# Patient Record
Sex: MALE | Race: WHITE | NOT HISPANIC OR LATINO | Employment: OTHER | URBAN - METROPOLITAN AREA
[De-identification: names, ages, dates, MRNs, and addresses within clinical notes are randomized per-mention and may not be internally consistent; named-entity substitution may affect disease eponyms.]

---

## 2023-03-23 ENCOUNTER — TELEPHONE (OUTPATIENT)
Dept: CARDIOLOGY CLINIC | Facility: CLINIC | Age: 88
End: 2023-03-23

## 2023-03-23 ENCOUNTER — CONSULT (OUTPATIENT)
Dept: CARDIOLOGY CLINIC | Facility: CLINIC | Age: 88
End: 2023-03-23

## 2023-03-23 VITALS
BODY MASS INDEX: 20.83 KG/M2 | HEIGHT: 64 IN | WEIGHT: 122 LBS | DIASTOLIC BLOOD PRESSURE: 64 MMHG | SYSTOLIC BLOOD PRESSURE: 160 MMHG | HEART RATE: 33 BPM | OXYGEN SATURATION: 100 %

## 2023-03-23 DIAGNOSIS — G30.1 MODERATE LATE ONSET ALZHEIMER'S DEMENTIA WITHOUT BEHAVIORAL DISTURBANCE, PSYCHOTIC DISTURBANCE, MOOD DISTURBANCE, OR ANXIETY (HCC): ICD-10-CM

## 2023-03-23 DIAGNOSIS — I65.23 OCCLUSION AND STENOSIS OF BILATERAL CAROTID ARTERIES: ICD-10-CM

## 2023-03-23 DIAGNOSIS — I65.23 BILATERAL CAROTID ARTERY STENOSIS: ICD-10-CM

## 2023-03-23 DIAGNOSIS — E78.2 MIXED DYSLIPIDEMIA: ICD-10-CM

## 2023-03-23 DIAGNOSIS — E05.90 SUBCLINICAL HYPERTHYROIDISM: ICD-10-CM

## 2023-03-23 DIAGNOSIS — H90.3 BILATERAL SENSORINEURAL HEARING LOSS: ICD-10-CM

## 2023-03-23 DIAGNOSIS — R00.1 SYMPTOMATIC BRADYCARDIA: ICD-10-CM

## 2023-03-23 DIAGNOSIS — I25.2 HISTORY OF HEART ATTACK: ICD-10-CM

## 2023-03-23 DIAGNOSIS — N18.30 STAGE 3 CHRONIC KIDNEY DISEASE, UNSPECIFIED WHETHER STAGE 3A OR 3B CKD (HCC): ICD-10-CM

## 2023-03-23 DIAGNOSIS — N40.1 BPH WITH OBSTRUCTION/LOWER URINARY TRACT SYMPTOMS: ICD-10-CM

## 2023-03-23 DIAGNOSIS — M48.062 SPINAL STENOSIS OF LUMBAR REGION WITH NEUROGENIC CLAUDICATION: ICD-10-CM

## 2023-03-23 DIAGNOSIS — I45.2 INCOMPLETE RIGHT BUNDLE BRANCH BLOCK (RBBB) WITH LEFT ANTERIOR FASCICULAR BLOCK: ICD-10-CM

## 2023-03-23 DIAGNOSIS — E53.8 COBALAMIN DEFICIENCY: ICD-10-CM

## 2023-03-23 DIAGNOSIS — Z87.39 HISTORY OF GOUT: ICD-10-CM

## 2023-03-23 DIAGNOSIS — N13.8 BPH WITH OBSTRUCTION/LOWER URINARY TRACT SYMPTOMS: ICD-10-CM

## 2023-03-23 DIAGNOSIS — R73.9 HYPERGLYCEMIA: ICD-10-CM

## 2023-03-23 DIAGNOSIS — I10 ESSENTIAL HYPERTENSION: ICD-10-CM

## 2023-03-23 DIAGNOSIS — K21.9 GERD WITHOUT ESOPHAGITIS: ICD-10-CM

## 2023-03-23 DIAGNOSIS — E55.9 VITAMIN D DEFICIENCY: ICD-10-CM

## 2023-03-23 DIAGNOSIS — D50.9 IRON DEFICIENCY ANEMIA, UNSPECIFIED IRON DEFICIENCY ANEMIA TYPE: ICD-10-CM

## 2023-03-23 DIAGNOSIS — Z85.46 HISTORY OF PROSTATE CANCER: ICD-10-CM

## 2023-03-23 DIAGNOSIS — F02.B0 MODERATE LATE ONSET ALZHEIMER'S DEMENTIA WITHOUT BEHAVIORAL DISTURBANCE, PSYCHOTIC DISTURBANCE, MOOD DISTURBANCE, OR ANXIETY (HCC): ICD-10-CM

## 2023-03-23 DIAGNOSIS — I44.1 SECOND DEGREE HEART BLOCK BY ELECTROCARDIOGRAM (ECG): Primary | ICD-10-CM

## 2023-03-23 DIAGNOSIS — I35.0 NON-RHEUMATIC AORTIC STENOSIS: ICD-10-CM

## 2023-03-23 PROBLEM — F03.B0 MODERATE DEMENTIA WITHOUT BEHAVIORAL DISTURBANCE, PSYCHOTIC DISTURBANCE, MOOD DISTURBANCE, OR ANXIETY (HCC): Status: ACTIVE | Noted: 2023-03-23

## 2023-03-23 PROBLEM — I1A.0 RESISTANT HYPERTENSION: Status: ACTIVE | Noted: 2023-03-23

## 2023-03-23 PROBLEM — E78.5 DYSLIPIDEMIA: Status: ACTIVE | Noted: 2023-03-23

## 2023-03-23 RX ORDER — DIVALPROEX SODIUM 125 MG/1
1 TABLET, DELAYED RELEASE ORAL 2 TIMES DAILY
COMMUNITY
Start: 2023-02-27

## 2023-03-23 RX ORDER — LANOLIN ALCOHOL/MO/W.PET/CERES
1 CREAM (GRAM) TOPICAL DAILY
COMMUNITY
Start: 2023-02-27

## 2023-03-23 RX ORDER — CHLORHEXIDINE GLUCONATE 0.12 MG/ML
15 RINSE ORAL ONCE
OUTPATIENT
Start: 2023-03-23 | End: 2023-03-23

## 2023-03-23 RX ORDER — SIMVASTATIN 10 MG
10 TABLET ORAL
Qty: 30 TABLET | Refills: 5
Start: 2023-03-23

## 2023-03-23 RX ORDER — COLCHICINE 0.6 MG/1
0.6 TABLET ORAL DAILY
Qty: 30 TABLET | Refills: 5
Start: 2023-03-23

## 2023-03-23 RX ORDER — DONEPEZIL HYDROCHLORIDE 5 MG/1
5 TABLET, FILM COATED ORAL
Qty: 30 TABLET | Refills: 5
Start: 2023-03-23

## 2023-03-23 RX ORDER — AMLODIPINE BESYLATE 2.5 MG/1
1 TABLET ORAL DAILY
COMMUNITY
Start: 2023-02-27

## 2023-03-23 RX ORDER — ACETAMINOPHEN 325 MG/1
2 TABLET ORAL EVERY 6 HOURS PRN
COMMUNITY
Start: 2023-02-20

## 2023-03-23 RX ORDER — LOSARTAN POTASSIUM 25 MG/1
25 TABLET ORAL DAILY
COMMUNITY
Start: 2023-02-27

## 2023-03-23 RX ORDER — DONEPEZIL HYDROCHLORIDE 10 MG/1
10 TABLET, FILM COATED ORAL DAILY
COMMUNITY
Start: 2023-02-27 | End: 2023-03-23 | Stop reason: DRUGHIGH

## 2023-03-23 RX ORDER — ALLOPURINOL 100 MG/1
1 TABLET ORAL DAILY
COMMUNITY
Start: 2023-02-27

## 2023-03-23 RX ORDER — SIMVASTATIN 20 MG
20 TABLET ORAL
COMMUNITY
Start: 2023-02-27 | End: 2023-03-23 | Stop reason: DRUGHIGH

## 2023-03-23 RX ORDER — DICYCLOMINE HYDROCHLORIDE 10 MG/1
1 CAPSULE ORAL
COMMUNITY
Start: 2023-02-27

## 2023-03-23 NOTE — TELEPHONE ENCOUNTER
Per Aliya Mendez at [facility] Cath lab consent must be obtained from Dr performing the procedure

## 2023-03-23 NOTE — PATIENT INSTRUCTIONS
Patient has strong indication for permanent pacemaker insertion with high degree second degree AV block, pulse rate of 33 bpm, possibly related to calcific aortic valve disease  We will need to discuss further with his medical power of  Danica Montes, [phone], who works from 9 AM to 5:30 PM Monday through Friday  We will expect a call back from [facility] regarding this matter

## 2023-03-23 NOTE — PROGRESS NOTES
Cardiology Office Consultation   Good Garcia 80 y o  male MRN: [id number]  03/23/23  7:57 PM        Assessment/Plan     1  Symptomatic bradycardia caused by second-degree AV block with 2: 1 conduction, heart rate of 33 bpm, with associated first-degree AV block, incomplete right bundle branch block and LAFB  2   Examination suggests at least moderately severe to severe aortic valve stenosis with potential associated microcalcification of the bundle of His, which could be the source of his advanced high degree AV block  3   Clinically stable CAD with history of remote myocardial infarction at age 46 and no recent coronary artery disease evaluation  4   History of treated mixed dyslipidemia  5   Longstanding essential hypertension, treated  6   Late onset Alzheimer's disease without behavioral disturbance  7   Bilateral sensorineural hearing loss  8   History of gout involving right foot  9   History of iron deficiency anemia and cobalamin deficiency as well as vitamin D deficiency  10   Stage III chronic kidney disease  11   History of prostate cancer and BPH with lower urinary tract symptoms  12   History of lumbar spinal stenosis with neurogenic claudication  13   History of bilateral carotid artery stenosis  14   History of thyroid nodule and subclinical hyperthyroidism  15   History of hiatal hernia and chronic constipation  Plan and   Patient Instructions     1  Patient has strong indication for permanent pacemaker insertion with high degree second degree AV block, pulse rate of 33 bpm, possibly related to calcific aortic valve disease  2  We will need to discuss further with his medical power of  Lisa Rosales, [phone], who works from 9 AM to 5:30 PM Monday through Friday  3  We will expect a call back from Jorden Moralez regarding this matter  4   I spoke to daughter Lisa Rosales, after the patient left the office, in her role as medical power of  for this patient    After explaining the details of the permanent pacemaker implantation procedure, which included indication, details of how the procedure is performed, complications, benefits, risks, she provided informed consent verbally for us to schedule the procedure as soon as possible, preferably on a Friday  We will proceed with the orders today  5   We have requested the following laboratory data prior to pacemaker implant: CBC, PT/PTT, CMP, A1c, lipid panel, TSH  Current Outpatient Medications   Medication Sig Dispense Refill   • acetaminophen (TYLENOL) 325 mg tablet Take 2 tablets by mouth every 6 (six) hours as needed for pain     • allopurinol (ZYLOPRIM) 100 mg tablet Take 1 tablet by mouth in the morning     • amLODIPine (NORVASC) 2 5 mg tablet Take 1 tablet by mouth daily     • colchicine (COLCRYS) 0 6 mg tablet Take 1 tablet (0 6 mg total) by mouth daily 30 tablet 5   • dicyclomine (BENTYL) 10 mg capsule Take 1 capsule by mouth 2 (two) times a day before meals     • divalproex sodium (DEPAKOTE) 125 mg EC tablet Take 1 tablet by mouth 2 (two) times a day     • donepezil (ARICEPT) 5 mg tablet Take 1 tablet (5 mg total) by mouth daily at bedtime 30 tablet 5   • losartan (COZAAR) 25 mg tablet Take 25 mg by mouth in the morning     • simvastatin (ZOCOR) 10 mg tablet Take 1 tablet (10 mg total) by mouth daily at bedtime 30 tablet 5   • vitamin B-12 (VITAMIN B-12) 1,000 mcg tablet Take 1 tablet by mouth in the morning       No current facility-administered medications for this visit  History of Present Illness     Physician Requesting Consult: [facility]      Reason for Consult / Principal Problem: Marked bradycardia      HPI:       Lorin Garcia is an 80y o  year old male who presents for cardiology consultation in follow-up of recently uncovered severe bradycardia with heart rates of 31-33 bpm and recent onset of increased sleepiness and decreased alertness    He was found to have severe bradycardia at a heart rate of 31 bpm on EKG dated 3/14/2023  His daughter reports that he is had increased recent onset of sleepiness compared to his normal pattern  This patient and his wife moved into the [facility] assisted living [facility] in December, 2022 because of his progressive dementia and inability to manage their day-to-day needs  The patient's past medical history includes remote heart attack at age of 46, stable chronic CAD, stage III chronic kidney disease, chronic iron deficiency anemia, cobalamin deficiency, history of prostate cancer and BPH with lower urinary tract symptoms, deafness with bilateral sensorineural hearing loss, hiatal hernia, dyslipidemia, essential hypertension, lumbar spinal stenosis with neurogenic claudication, chronic constipation, bilateral carotid artery stenosis, thyroid nodule, late onset Alzheimer's disease, currently with no behavioral disturbance, subclinical hyperthyroidism, weight loss, hyperglycemia, orthostatic hypotension, gout of right great toe, vitamin D deficiency  He does have a history of a heart murmur which has not been formally evaluated  Apart from increased sleepiness and diminished alertness recently, the patient denies any recent chest pain, dyspnea, syncope, lightheadedness, dizziness, palpitations, cough, wheezing, sputum production, edema, fever, chills  Family history was noncontributory  The social history was positive for former smoking with patient having quit smoking about 28 years ago  The patient was a 1 glass of wine drinker until about 3 months ago  There is no history of illicit drug use  He is a retired  and is currently residing in assisted living with his wife  They moved from their home in [place], [place] to the above assisted living facility  The patient's medical power of  is Randall Trejo, phone number [phone]    His other daughter accompanying the patient today is [place] Carline  Historical Information   Past Medical History:   Diagnosis Date   • History of heart attack    CAD  Chronic kidney disease, stage III   Chronic iron deficiency anemia  Cobalamin deficiency  History of prostate cancer and BPH with lower urinary tract symptoms  Bilateral sensorineural hearing loss  Hiatal hernia  Dyslipidemia  Essential hypertension  Lumbar spinal stenosis with neurogenic claudication  Chronic constipation  Bilateral carotid artery stenosis  Thyroid nodule  Late onset Alzheimer's disease with previous behavioral disturbance  Subclinical hyperthyroidism  Weight loss  Hyperglycemia  Orthostatic hypotension  Gout of great right toe  Vitamin D deficiency    Past Surgical History:  Past Surgical History:   Procedure Laterality Date   • HIATAL HERNIA REPAIR     Unspecified bilateral knee surgery  Greenlight laser PVP of prostate  Right ankle repair      Social History     Substance and Sexual Activity   Alcohol Use Yes   Formerly drank 1 glass of wine per day  Social History     Substance and Sexual Activity   Drug Use Never     Social History     Tobacco Use   Smoking Status Former   • Packs/day: 1 00   • Types: Cigarettes   • Quit date:    • Years since quittin   Smokeless Tobacco Not on file     History reviewed  No pertinent family history  Meds/Allergies   Prior to Admission medications    Medication Sig Start Date End Date Taking?  Authorizing Provider   acetaminophen (TYLENOL) 325 mg tablet Take 2 tablets by mouth every 6 (six) hours as needed for pain 23  Yes Historical Provider, MD   allopurinol (ZYLOPRIM) 100 mg tablet Take 1 tablet by mouth in the morning 23  Yes Historical Provider, MD   amLODIPine (NORVASC) 2 5 mg tablet Take 1 tablet by mouth daily 23  Yes Historical Provider, MD   colchicine (COLCRYS) 0 6 mg tablet Take 1 tablet (0 6 mg total) by mouth daily 3/23/23  Yes Dipak Durant MD   dicyclomine (BENTYL) 10 mg capsule Take 1 capsule by mouth 2 (two) times a day before meals 2/27/23  Yes Historical Provider, MD   divalproex sodium (DEPAKOTE) 125 mg EC tablet Take 1 tablet by mouth 2 (two) times a day 2/27/23  Yes Historical Provider, MD   donepezil (ARICEPT) 5 mg tablet Take 1 tablet (5 mg total) by mouth daily at bedtime 3/23/23  Yes Argenis Perdue MD   losartan (COZAAR) 25 mg tablet Take 25 mg by mouth in the morning 2/27/23  Yes Historical Provider, MD   simvastatin (ZOCOR) 10 mg tablet Take 1 tablet (10 mg total) by mouth daily at bedtime 3/23/23  Yes Argenis Perdue MD   vitamin B-12 (VITAMIN B-12) 1,000 mcg tablet Take 1 tablet by mouth in the morning 2/27/23  Yes Historical Provider, MD   donepezil (ARICEPT) 10 mg tablet Take 10 mg by mouth daily 2/27/23 3/23/23 Yes Historical Provider, MD   simvastatin (ZOCOR) 20 mg tablet Take 20 mg by mouth daily at bedtime 2/27/23 3/23/23 Yes Historical Provider, MD     No Known Allergies    Cardiovascular ROS:   More than 10 systems reviewed and all systems negative, except as noted in history of present illness    Objective     VITALS:   Blood pressure 160/64, pulse (!) 33, height 5' 4" (1 626 m), weight 55 3 kg (122 lb), SpO2 100 %  Body mass index is 20 94 kg/m²  12 pound weight gain in approximately 3-1/2 months    Physical Exam      General-Well-developed slender very hard of hearing elderly  male  in no acute distress  Patient is alert, oriented X3 and cooperative  Skin-Warm and dry with no pallor, rashes, ecchymoses, lesions  HEENT-Normocephalic  Pupils equally round and reactive to light and accommodation  Extraocular muscles intact  Mucous membranes pink and moist  Sclerae nonicteric  Optic fundi poorly seen  Neck-No jugular venous distention, AJR, masses, thyromegaly, adenopathy, bruits  Lungs-No rales, rhonchi, wheezes  Heart-No  gallop, rub, click, heave, thrill    Grade 3/6 medium pitched aortic systolic ejection murmur heard with radiation to both clavicles, manubrium, left more than right sternal border, and apex  The apical systolic ejection murmur radiates somewhat to the anterior axillary line  There is moderate muffling of A2 and a somewhat sluggish carotid upstroke  Abdomen-Normal bowel sounds with no masses, organomegaly, guarding, tenderness, or rigidity  Extremities-No cyanosis, clubbing, edema, pulse decrease  Neurological-No focal neurological signs  EKG 03/23/23:     Normal sinus rhythm with 2: 1 second-degree AV block, first-degree AV block and marked bradycardia at 33 bpm  Incomplete right bundle branch block and mild degree of LAFB  Prolonged QT  Abnormal ECG, unchanged from 3/14/2023      IMAGING:    No Chest XR results available for this patient  LAB REVIEW:    No results found for: SODIUM, K, CL, CO2, ANIONGAP, BUN, CREATININE, GLUCOSE, GLUF, CALCIUM, CORRECTEDCA, AST, ALT, ALKPHOS, PROT, BILITOT, EGFR  No results found for: CHOLESTEROL  No results found for: HDL  No results found for: LDLCHOLEST  No results found for: LDLCALC  No components found for: DIRECTLDLREFLEX  No results found for: IGS5IBZPBJPE  No results found for: TRIG          Total office time spent today 64  minutes  Because of complexity of reviewing all records provided by outside physician, need to call power of  in addition to speaking with patient and his daughter present in the office, this visit was very complex with complex medical decision making and order construction          Jean Haynes MD

## 2023-03-27 ENCOUNTER — TELEPHONE (OUTPATIENT)
Dept: CARDIOLOGY CLINIC | Facility: CLINIC | Age: 88
End: 2023-03-27

## 2023-03-27 NOTE — TELEPHONE ENCOUNTER
Left message for daughter to call back   Per Dr Juan Abdalla schedule Pacemaker with Dr Mandy Santamaria at [facility]

## 2023-03-29 ENCOUNTER — TELEPHONE (OUTPATIENT)
Dept: CARDIOLOGY CLINIC | Facility: CLINIC | Age: 88
End: 2023-03-29

## 2023-03-29 NOTE — TELEPHONE ENCOUNTER
Cardiac cath lab called April 14 date Is not available with anesthesia  Spoke to daughter Maria Elena Del Real she would like it to be done on a Friday next available date is not until April 21   Please advise

## 2023-04-02 ENCOUNTER — HOSPITAL ENCOUNTER (EMERGENCY)
Facility: HOSPITAL | Age: 88
Discharge: NURSING, REHAB OR LONG-TERM CARE | End: 2023-04-03
Attending: EMERGENCY MEDICINE | Admitting: EMERGENCY MEDICINE

## 2023-04-02 DIAGNOSIS — R00.1 SINUS BRADYCARDIA: Primary | ICD-10-CM

## 2023-04-02 NOTE — ED NOTES
Patient assisted with urinal  Standing at bedside with minimal standby assist  Patient resting in bed watching television  Patient is comfortable and denies any needs       Bernardo Singh RN  04/02/23 5873

## 2023-04-03 VITALS
DIASTOLIC BLOOD PRESSURE: 57 MMHG | RESPIRATION RATE: 20 BRPM | HEART RATE: 74 BPM | TEMPERATURE: 98 F | OXYGEN SATURATION: 99 % | SYSTOLIC BLOOD PRESSURE: 124 MMHG

## 2023-04-03 LAB
ATRIAL RATE: 40 BPM
P AXIS: 37 DEGREES
PR INTERVAL: 276 MS
QRS AXIS: 12 DEGREES
QRSD INTERVAL: 116 MS
QT INTERVAL: 506 MS
QTC INTERVAL: 412 MS
T WAVE AXIS: 60 DEGREES
VENTRICULAR RATE: 40 BPM

## 2023-04-03 RX ORDER — OLANZAPINE 10 MG/1
10 INJECTION, POWDER, LYOPHILIZED, FOR SOLUTION INTRAMUSCULAR ONCE
Status: COMPLETED | OUTPATIENT
Start: 2023-04-03 | End: 2023-04-03

## 2023-04-03 RX ADMIN — OLANZAPINE 10 MG: 10 INJECTION, POWDER, FOR SOLUTION INTRAMUSCULAR at 06:20
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
## 2023-04-03 NOTE — TELEPHONE ENCOUNTER
Per Dr Matteo Cespedes he spoke to anesthesia Anson Castano is scheduled for April 14 at [facility] for pacemaker  At 8 am      Daughter Desean Ruiz aware will notify her sister Juan Pablo Pulido unable to lm on her voicemeil
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
## 2023-04-14 PROBLEM — I21.9 MI (MYOCARDIAL INFARCTION) (HCC): Status: ACTIVE | Noted: 2023-04-14

## 2023-04-25 ENCOUNTER — CONSULT (OUTPATIENT)
Dept: CARDIOLOGY CLINIC | Facility: CLINIC | Age: 88
End: 2023-04-25

## 2023-04-25 VITALS
BODY MASS INDEX: 21 KG/M2 | OXYGEN SATURATION: 96 % | DIASTOLIC BLOOD PRESSURE: 80 MMHG | WEIGHT: 123 LBS | HEIGHT: 64 IN | HEART RATE: 75 BPM | SYSTOLIC BLOOD PRESSURE: 142 MMHG

## 2023-04-25 DIAGNOSIS — E78.2 MIXED HYPERLIPIDEMIA: ICD-10-CM

## 2023-04-25 DIAGNOSIS — I44.1 SECOND DEGREE HEART BLOCK BY ELECTROCARDIOGRAM (ECG): Primary | ICD-10-CM

## 2023-04-25 DIAGNOSIS — Z95.0 PACEMAKER: ICD-10-CM

## 2023-04-25 DIAGNOSIS — R00.1 BRADYCARDIA: ICD-10-CM

## 2023-04-25 NOTE — PROGRESS NOTES
Progress Note - Cardiology Office  [facility]    Ry Chris 80 y o  male MRN: [id number]  : 1935  Encounter: [id number]      Assessment:     1  Second degree heart block by electrocardiogram (ECG)    2  Bradycardia    3  Mixed hyperlipidemia    4  Pacemaker        Discussion Summary and Plan:    1  Sick sinus syndrome/second-degree AV heart block 2-1 conduction: Underwent placement of dual-chamber Medtronic pacemaker on 2023    -   Incisional site stable    -   Daughter states that remote monitoring box was placed by his bedside as per instructed in the hospital    -   Continue with arm immobilization for full 3 weeks    2  Hypertension: Blood pressures currently stable    -   Continue Norvasc 2 5 mg daily    -   Continue Cozaar 25 mg daily    3  Alzheimer's: Managed per [facility]    4  Dyslipidemia: Continue Zocor 10 mg daily        Patient / Sana Alvarado was advised and educated to call our office  immediately if  patient has any new symptoms of chest pain/shortness of breath, near-syncope, syncope, light headedness sustained palpitations  or any other cardiovascular symptoms before their scheduled follow-up appointment  Office number was provided #[phone]      Please call [phone] if any questions  Counseling :  A description of the counseling  Goals and Barriers  Patient's ability to self care: Yes  Medication side effect reviewed with patient in detail and all their questions answered to their satisfaction  HPI :     Ry Chris is a 80y o  year old male who came for follow up  He underwent placement of a dual-chamber Medtronic pacemaker on 2023 due to symptomatic bradycardia/second-degree AV heart block with 2-1 conduction  He was discharged home the next day and presents for site check today  Patient has been using the immobilizer to keep his left arm from elevating above his shoulder due to his history of dementia    Device appears to be functioning within normal limits and incision is well approximated and healed  Daughter Ena Strong is at bedside and reviewed discharge procedures and follow-up for pacemaker  Patient will need a 1 month follow-up with the device clinic here at the office and then we would continue remote interrogations after  Most likely would only be seen yearly for device checks in office and as needed for patient's history of hypertension  Review of Systems   Constitutional: Negative  Negative for activity change, fatigue and unexpected weight change  HENT: Negative  Negative for congestion, facial swelling, sinus pain and tinnitus  Eyes: Negative  Negative for photophobia and visual disturbance  Respiratory: Negative  Negative for chest tightness and shortness of breath  Cardiovascular: Negative  Gastrointestinal: Negative for abdominal distention, constipation and rectal pain  Endocrine: Negative  Negative for polydipsia, polyphagia and polyuria  Genitourinary: Negative  Negative for difficulty urinating  Musculoskeletal: Negative  Neurological: Negative  Negative for dizziness, syncope, weakness and numbness  Hematological: Negative  Psychiatric/Behavioral: Negative  Historical Information   Past Medical History:   Diagnosis Date   • History of heart attack      Past Surgical History:   Procedure Laterality Date   • CARDIAC ELECTROPHYSIOLOGY PROCEDURE N/A 2023    Procedure: Cardiac pacer implant;  Surgeon: Jose Alexander MD;  Location: [place] [address] CATH LAB;   Service: Cardiology   • HIATAL HERNIA REPAIR       Social History     Substance and Sexual Activity   Alcohol Use Not Currently     Social History     Substance and Sexual Activity   Drug Use Never     Social History     Tobacco Use   Smoking Status Former   • Packs/day: 1 00   • Types: Cigarettes   • Quit date: 12   • Years since quittin 3   Smokeless Tobacco Never     Family History: No family "history on file  Meds/Allergies     No Known Allergies    Current Outpatient Medications:   •  allopurinol (ZYLOPRIM) 100 mg tablet, Take 1 tablet by mouth in the morning, Disp: , Rfl:   •  amLODIPine (NORVASC) 2 5 mg tablet, Take 1 tablet by mouth daily, Disp: , Rfl:   •  colchicine (COLCRYS) 0 6 mg tablet, Take 1 tablet (0 6 mg total) by mouth daily, Disp: 30 tablet, Rfl: 5  •  dicyclomine (BENTYL) 10 mg capsule, Take 1 capsule by mouth 2 (two) times a day before meals, Disp: , Rfl:   •  divalproex sodium (DEPAKOTE) 125 mg EC tablet, Take 1 tablet by mouth 2 (two) times a day, Disp: , Rfl:   •  donepezil (ARICEPT) 5 mg tablet, Take 1 tablet (5 mg total) by mouth daily at bedtime, Disp: 30 tablet, Rfl: 5  •  losartan (COZAAR) 25 mg tablet, Take 25 mg by mouth in the morning, Disp: , Rfl:   •  simvastatin (ZOCOR) 10 mg tablet, Take 1 tablet (10 mg total) by mouth daily at bedtime, Disp: 30 tablet, Rfl: 5  •  vitamin B-12 (VITAMIN B-12) 1,000 mcg tablet, Take 1 tablet by mouth in the morning, Disp: , Rfl:     Vitals: Blood pressure 142/80, pulse 75, height 5' 4\" (1 626 m), weight 55 8 kg (123 lb), SpO2 96 %  Body mass index is 21 11 kg/m²  Wt Readings from Last 3 Encounters:   04/25/23 55 8 kg (123 lb)   04/14/23 56 7 kg (125 lb)   03/23/23 55 3 kg (122 lb)     Vitals:    04/25/23 1046   Weight: 55 8 kg (123 lb)     BP Readings from Last 3 Encounters:   04/25/23 142/80   04/15/23 (!) 174/68   04/02/23 124/57       Physical Exam:  Physical Exam  Vitals and nursing note reviewed  Constitutional:       General: He is not in acute distress  Appearance: Normal appearance  He is normal weight  HENT:      Right Ear: External ear normal       Left Ear: External ear normal    Eyes:      General: No scleral icterus  Right eye: No discharge  Left eye: No discharge  Cardiovascular:      Rate and Rhythm: Normal rate and regular rhythm  Pulses: Normal pulses        Heart sounds: Normal heart " sounds  Comments: Left anterior chest wall incision well approximated, Steri-Strips in place  Pulmonary:      Effort: Pulmonary effort is normal       Breath sounds: Normal breath sounds  Abdominal:      General: Bowel sounds are normal       Palpations: Abdomen is soft  Musculoskeletal:      Right lower leg: No edema  Left lower leg: No edema  Skin:     General: Skin is warm  Capillary Refill: Capillary refill takes less than 2 seconds  Neurological:      Mental Status: He is alert  Psychiatric:         Mood and Affect: Mood normal          Diagnostic Studies Review Cardio:      Cardiac testing:   XR chest portable    Result Date: 4/14/2023  Narrative: CHEST INDICATION:   Patient s/p Pacemaker/ICD Insertion  COMPARISON:  None  EXAM PERFORMED/VIEWS:  XR CHEST PORTABLE Images: 2 FINDINGS: Mildly increased interstitial lung markings are seen  There is blunting of the right costophrenic angle  No evidence of pneumothorax  Cardiac silhouette not accurately accessed on this projection  The patient is status post left-sided single lead pacemaker  Impression: Left-sided single-lead pacemaker in place  No evidence of pneumothorax  Small right pleural effusion  Mildly increased interstitial lung markings suggestive of pulmonary edema  Workstation performed: [id number]     XR chest 2 views    Result Date: 4/15/2023  Narrative: CHEST INDICATION:   Patient s/p Pacemaker/ICD Insertion  COMPARISON:  One view chest 4/14/2023 EXAM PERFORMED/VIEWS:  XR CHEST PA & LATERAL  The frontal view was performed utilizing dual energy radiographic technique  FINDINGS:  Left-sided chest wall intracardiac device is identified  Leads are intact  Normal cardiac silhouette  Aortic and mitral annular calcification is present  Prominent interstitial markings diffusely  Small right pleural effusion  No pneumothorax or airspace consolidation  Old healed left midclavicular fracture  Thoracic spondylosis   Surgical clips right upper quadrant of the abdomen attributed to prior cholecystectomy  Impression: Left cardiac pacer device in place  Leads appear intact  No pneumothorax  Small right pleural effusion  Prominent interstitial markings may represent mild interstitial edema or interstitial lung disease  Workstation performed: [id number]     Cardiac ep lab eps/ablations    Result Date: 4/14/2023  Narrative: Images from the original result were not included  Single-chamber Cardiac Pacemaker Placement Operative Report Russellchace Kirit [id number] 4/14/2023 @PCP@ Surgeon: Carlene Thomas MD Procedure(s): Single Chamber Permanent Pacemaker Implantation; Cardiac Fluoroscopy Indications: Secondary AV block, symptomatic bradycardia Procedure Details: The risks, benefits, complications,  alternative treatment options, and expected outcomes were discussed with the patient  The complications of infection, pneumothorax, cardiac perforation, cardiac tamponade, and even death, but not limited to it were discussed with patient the family, before the patient was brought to cath lab  Patient and family were explained about lead dislodgement and need for repeat procedures  Patient and family told that lead dislodgement is not a complication of procedure  The patient and/or family concurred with the proposed plan, giving informed consent  Patient was prepped and draped in the usual strict sterile fashion  Antibiotic infusion was started and above 20 cc Sensorcaine was infiltrated into the area just medial to the left deltopectoral groove  Access was obtained in left subclavian vein with Seldinger technique with ultrasound guidance and a guidewire was retained  Patient tolerated that procedure well no complication  This patient has advanced dementia it was decided to have a single-chamber pacemaker and anesthesia was used to provide sedation  Using a #15 scalpel, an incision was made   The incision was extended to the prepectoral fascia using blunt dissection as well as electrocautery  A small pocket was made and complete hemostasis was achieved  A antibiotic-soaked gauze was placed in the cavity  A 7F sheath passed over the  wire  Guiding catheter was placed through the sheath over the wire  With the help of J-tip wire guiding catheter was guided to the right ventricle  Once it was noted to be in septal area close to bundle of his  A HIS lead was placed to the guiding catheter while making sure that guiding catheter is RV septal area close to the bundle of HIS  Thresholds were checked and once we were sure that we are close to to the fascicles lead was screwed in  Position of the lead was confirmed in both COLBERT and MELVIN views Appropriate sensing and thresholds were noted  If all the numbers were acceptable the guiding catheter was carefully slit open and again the number were tested again  Adequate sensing and capture threshold and lead impedance was conformed again  Then 7 Divehi sheath was slit open  The lead was tied down to the prepectoral fascia and muscle  Thresholds were tested multiple times    Appropriate sensing and thresholds were obtained  No diaphragmatic pacing occurred at 10 V and 1 5 ms  A guide wire was advanced to the heart under fluoroscopic guidance  A sheath was advanced over the guidewire  A pacemaker lead was advanced to the heart under fluoroscopic guidance  The lead was fixated to the right ventricular apex  Appropriate sensing and thresholds were obtained  No diaphragmatic pacing occurred at 10 V and 1 5 ms  Position of the lead was confirmed in both COLBERT and MELVIN projections  If numbers were acceptable sheath was peeled away  Numbers were tested again for lead impedance, sensing and threshold  The lead was attached to the generator  The system was placed in the pocket  The pacemaker and lead system was visualized under fluoroscopy  Appropriate redundancy/slack in the leads were noted   The pins of the lead was beyond "the set screws  Antibiotic pouch was used to cover the pacemaker in the pocket  Hemostasis was reverified  The pocket was then closed with 2 running layers of the dissolvable sutures   Steri-Strips, a gauze dressing, and pressure dressing was placed over operative site  Pacemaker Data: Pacemaker is chronic single-chamber Measured Data: Estimated Blood Loss:  Less than 5 cc      Complications: No complications Disposition: Patient was transferred to holding area in stable condition  Condition: Stable Impression: Successful placement of single chamber pacemaker without any complications  Plan see as ordered  Dr Jesus Carrizales MD [facility] \"This note was completed in part utilizing [employer] direct voice recognition software  Grammatical errors, random word insertion, spelling mistakes, and incomplete sentences may be an occasional consequence of the system secondary to software limitations, ambient noise and hardware issues  Please read the chart carefully and recognize, using context, where substitutions have occurred  If you have any questions or concerns about the context, text or information contained within the body of this dictation, please contact myself, the provider, for further clarification  \"     Echo complete w/ contrast if indicated    Result Date: 4/14/2023  Narrative: •  Left Ventricle: Left ventricular cavity size is normal  Wall thickness is mildly increased  There is mild concentric hypertrophy  The left ventricular ejection fraction is 60% by visual estimation  Systolic function is normal  Wall motion is normal  Diastolic function is mildly abnormal, consistent with grade I (abnormal) relaxation  •  Left Atrium: The atrium is moderately dilated  •  Aortic Valve: The aortic valve is trileaflet  The leaflets are moderately thickened  The leaflets are moderately calcified  There is moderately reduced mobility  There is mild regurgitation  •  Mitral Valve:  There is severe annular " "calcification  There is moderate regurgitation  There is mild stenosis  Mean gradient around 5 mmHg  •  Tricuspid Valve: There is mild to moderate regurgitation  PAP 40 mm of Hg  •  Pulmonic Valve: There is mild regurgitation  •  Pericardium: There is a small pericardial effusion  Imaging:  Chest X-Ray:   XR chest 2 views    Result Date: 4/15/2023  Impression Left cardiac pacer device in place  Leads appear intact  No pneumothorax  Small right pleural effusion  Prominent interstitial markings may represent mild interstitial edema or interstitial lung disease  Workstation performed: [id number]         [facility]  Cardiology        \"This note was completed in part utilizing [employer] direct voice recognition software  Grammatical errors, random word insertion, spelling mistakes, and incomplete sentences may be an occasional consequence of the system secondary to software limitations, ambient noise and hardware issues  Please read the chart carefully and recognize, using context, where substitutions have occurred  If you have any questions or concerns about the context, text or information contained within the body of this dictation, please contact myself, the provider, for further clarification  \"  "

## 2023-04-28 ENCOUNTER — REMOTE DEVICE CLINIC VISIT (OUTPATIENT)
Dept: CARDIOLOGY CLINIC | Facility: CLINIC | Age: 88
End: 2023-04-28

## 2023-04-28 DIAGNOSIS — Z95.0 PRESENCE OF PERMANENT CARDIAC PACEMAKER: Primary | ICD-10-CM

## 2023-04-28 NOTE — PROGRESS NOTES
Results for orders placed or performed in visit on 04/28/23   Cardiac EP device report    Narrative    MDT SC/PM-ACTIVE SYSTEM IS MRI CONDITIONAL  CARELINK TRANSMISSION: BATTERY VOLTAGE ADEQUATE  (12 4 YRS)  99%  ALL AVAILABLE LEAD PARAMETERS WITHIN NORMAL LIMITS  NO SIGNIFICANT HIGH RATE EPISODES  NORMAL DEVICE FUNCTION  ---CHAVIRA

## 2023-10-19 ENCOUNTER — TELEPHONE (OUTPATIENT)
Dept: CARDIOLOGY CLINIC | Facility: CLINIC | Age: 88
End: 2023-10-19

## 2023-10-19 ENCOUNTER — REMOTE DEVICE CLINIC VISIT (OUTPATIENT)
Dept: CARDIOLOGY CLINIC | Facility: CLINIC | Age: 88
End: 2023-10-19
Payer: MEDICARE

## 2023-10-19 DIAGNOSIS — Z95.0 PRESENCE OF PERMANENT CARDIAC PACEMAKER: Primary | ICD-10-CM

## 2023-10-19 PROCEDURE — 93296 REM INTERROG EVL PM/IDS: CPT | Performed by: INTERNAL MEDICINE

## 2023-10-19 PROCEDURE — 93294 REM INTERROG EVL PM/LDLS PM: CPT | Performed by: INTERNAL MEDICINE

## 2023-10-19 NOTE — PROGRESS NOTES
Results for orders placed or performed in visit on 10/19/23   Cardiac EP device report    Narrative    MDT SC/PM-ACTIVE SYSTEM IS MRI CONDITIONAL  CARELINK TRANSMISSION: BATTERY VOLTAGE ADEQUATE. (13 YRS)  99%. ALL AVAILABLE LEAD PARAMETERS WITHIN NORMAL LIMITS. 1 VHR EPISODE DETECTED 14 BEATS @ 330ms. PATIENT IS NOT ON BBLOCKER; EF 60% (2023). NORMAL DEVICE FUNCTION. ---CHAVIRA

## 2023-10-19 NOTE — TELEPHONE ENCOUNTER
----- Message from Lang Aleman MD sent at 10/19/2023 12:08 PM EDT -----  Patient's device interrogation reading shows,  device function is normal.  Episode of fast ventricular rate noted otherwise device is functioning normally      Please call patient.

## 2023-10-20 ENCOUNTER — TELEPHONE (OUTPATIENT)
Dept: CARDIOLOGY CLINIC | Facility: CLINIC | Age: 88
End: 2023-10-20

## 2023-10-20 NOTE — TELEPHONE ENCOUNTER
----- Message from Isaura Paz MD sent at 10/20/2023 11:04 AM EDT -----  Reviewed all notes/transmission. Normal device function. Please let patient know.

## 2023-11-04 ENCOUNTER — HOSPITAL ENCOUNTER (EMERGENCY)
Facility: HOSPITAL | Age: 88
Discharge: HOME | DRG: 178 | End: 2023-11-05
Attending: EMERGENCY MEDICINE
Payer: MEDICARE

## 2023-11-04 DIAGNOSIS — U07.1 COVID-19: ICD-10-CM

## 2023-11-04 DIAGNOSIS — F03.90 DEMENTIA (HCC): Primary | ICD-10-CM

## 2023-11-04 LAB
BACTERIA UR QL AUTO: ABNORMAL /HPF
BILIRUB UR QL STRIP: NEGATIVE
CLARITY UR: CLEAR
COLOR UR: YELLOW
GLUCOSE UR STRIP-MCNC: NEGATIVE MG/DL
HGB UR QL STRIP.AUTO: ABNORMAL
KETONES UR STRIP-MCNC: NEGATIVE MG/DL
LEUKOCYTE ESTERASE UR QL STRIP: NEGATIVE
MUCOUS THREADS UR QL AUTO: ABNORMAL
NITRITE UR QL STRIP: NEGATIVE
NON-SQ EPI CELLS URNS QL MICRO: ABNORMAL /HPF
PH UR STRIP.AUTO: 5.5 [PH]
PROT UR STRIP-MCNC: NEGATIVE MG/DL
RBC #/AREA URNS AUTO: ABNORMAL /HPF
SP GR UR STRIP.AUTO: 1.02 (ref 1–1.03)
UROBILINOGEN UR QL STRIP.AUTO: 0.2 E.U./DL
WBC #/AREA URNS AUTO: ABNORMAL /HPF

## 2023-11-04 PROCEDURE — 99284 EMERGENCY DEPT VISIT MOD MDM: CPT | Performed by: EMERGENCY MEDICINE

## 2023-11-04 PROCEDURE — 99285 EMERGENCY DEPT VISIT HI MDM: CPT

## 2023-11-04 PROCEDURE — 81001 URINALYSIS AUTO W/SCOPE: CPT | Performed by: EMERGENCY MEDICINE

## 2023-11-04 RX ORDER — ACETAMINOPHEN 325 MG/1
975 TABLET ORAL ONCE
Status: COMPLETED | OUTPATIENT
Start: 2023-11-04 | End: 2023-11-04

## 2023-11-04 RX ADMIN — ACETAMINOPHEN 975 MG: 325 TABLET ORAL at 23:04

## 2023-11-05 VITALS
OXYGEN SATURATION: 95 % | TEMPERATURE: 98.6 F | SYSTOLIC BLOOD PRESSURE: 138 MMHG | RESPIRATION RATE: 18 BRPM | DIASTOLIC BLOOD PRESSURE: 75 MMHG | HEART RATE: 70 BPM

## 2023-11-05 NOTE — ED NOTES
Family providing transport to facility. Report provided to facility and family.      Irena Bearden, RN  11/05/23 [id number]

## 2023-11-05 NOTE — DISCHARGE INSTRUCTIONS
We tested his urine here which was negative. Likely this patient is a little bit more confused because of his COVID-19 infection. Will be important to make sure to treat the fever we will antipyretics such as Tylenol every 6 hours. Please have him follow-up with primary care doctor. Otherwise return to ER if he develops any chest pain or shortness of breath.

## 2023-11-05 NOTE — ED NOTES
Ride requested via round trip back to [facility].       Zeeshan Trinh, [address]  11/04/23 5411

## 2023-11-05 NOTE — ED NOTES
Pt family member called to state she would be here to  patient but lives 45 mins away and was unsure when she would be here. On report, this RN was told that transportation was scheduled through [address] for 8am. Upon relaying that information, and before this RN had a chance to ask if the family member wanted to make other arrangements, the family member began screaming about the cost of transportation. Family member will be picking patient up at unknown time, Roundtrip cancelled.       Beau Llamas RN  11/05/23 8440

## 2023-11-05 NOTE — ED PROVIDER NOTES
History  Chief Complaint   Patient presents with    Fever     Patient     Altered Mental Status     Patient arrived via BLS from [facility]. Patient tested positive today at facility for [facility]. Facility states that the patient was walking around today, confused, knocking on doors  not acting like himself. 75-year-old male past medical history significant for dementia as well as testing positive for COVID at the facility that he is coming from presenting to ED today with concern symptoms study that the patient had elevated blood pressure as well as some more confusion. Patient unable to provide history secondary to his dementia status. History was provided by nursing staff at [facility]. I called to discuss. They state that the patient was found peeing on someone else's door. This is a new behavior for him and so they were concerned. They also state that his blood pressure was elevated to the 173K systolic and his heart rate was in the high, and that when they were doing the vitals he was agitated initially. They were unsure whether or not that this could be secondary to urinary tract infection or whether or not this could be secondary to his new onset COVID infection. Patient here for me is denying any complaints at this time. Prior to Admission Medications   Prescriptions Last Dose Informant Patient Reported?  Taking?   allopurinol (ZYLOPRIM) 100 mg tablet  Self Yes No   Sig: Take 1 tablet by mouth in the morning   amLODIPine (NORVASC) 2.5 mg tablet  Self Yes No   Sig: Take 1 tablet by mouth daily   colchicine (COLCRYS) 0.6 mg tablet  Self No No   Sig: Take 1 tablet (0.6 mg total) by mouth daily   dicyclomine (BENTYL) 10 mg capsule  Self Yes No   Sig: Take 1 capsule by mouth 2 (two) times a day before meals   divalproex sodium (DEPAKOTE) 125 mg EC tablet  Self Yes No   Sig: Take 1 tablet by mouth 2 (two) times a day   donepezil (ARICEPT) 5 mg tablet  Self No No   Sig: Take 1 tablet (5 mg total) by mouth daily at bedtime   losartan (COZAAR) 25 mg tablet  Self Yes No   Sig: Take 25 mg by mouth in the morning   simvastatin (ZOCOR) 10 mg tablet  Self No No   Sig: Take 1 tablet (10 mg total) by mouth daily at bedtime   vitamin B-12 (VITAMIN B-12) 1,000 mcg tablet  Self Yes No   Sig: Take 1 tablet by mouth in the morning      Facility-Administered Medications: None       Past Medical History:   Diagnosis Date    History of heart attack        Past Surgical History:   Procedure Laterality Date    CARDIAC ELECTROPHYSIOLOGY PROCEDURE N/A 2023    Procedure: Cardiac pacer implant;  Surgeon: Frankey Rivers, MD;  Location: [address] [place] [address] CATH LAB; Service: Cardiology    HIATAL HERNIA REPAIR         No family history on file. I have reviewed and agree with the history as documented. E-Cigarette/Vaping    E-Cigarette Use Never User      E-Cigarette/Vaping Substances    Nicotine No     THC No     CBD No     Flavoring No     Other No     Unknown No      Social History     Tobacco Use    Smoking status: Former     Packs/day: 1.00     Types: Cigarettes     Quit date:      Years since quittin.8    Smokeless tobacco: Never   Vaping Use    Vaping Use: Never used   Substance Use Topics    Alcohol use: Not Currently    Drug use: Never       Review of Systems    Physical Exam  Physical Exam  Vitals and nursing note reviewed. Constitutional:       General: He is not in acute distress. Appearance: Normal appearance. He is not ill-appearing. HENT:      Head: Normocephalic and atraumatic. Right Ear: External ear normal.      Left Ear: External ear normal.      Nose: Nose normal. No congestion. Mouth/Throat:      Mouth: Mucous membranes are moist.      Pharynx: Oropharynx is clear. No oropharyngeal exudate. Eyes:      General:         Right eye: No discharge. Left eye: No discharge. Extraocular Movements: Extraocular movements intact.       Conjunctiva/sclera: Conjunctivae normal. Pupils: Pupils are equal, round, and reactive to light. Cardiovascular:      Rate and Rhythm: Normal rate and regular rhythm. Pulses: Normal pulses. Heart sounds: Normal heart sounds. No murmur heard. Pulmonary:      Effort: Pulmonary effort is normal. No respiratory distress. Breath sounds: Normal breath sounds. No wheezing. Abdominal:      General: Abdomen is flat. Bowel sounds are normal. There is no distension. Palpations: Abdomen is soft. Tenderness: There is no abdominal tenderness. Musculoskeletal:         General: No swelling. Normal range of motion. Cervical back: Normal range of motion. No rigidity. Skin:     General: Skin is warm and dry. Capillary Refill: Capillary refill takes less than 2 seconds. Neurological:      Mental Status: He is alert. Cranial Nerves: No cranial nerve deficit. Sensory: No sensory deficit. Motor: No weakness. Gait: Gait normal.      Comments: Patient demented at baseline.  He can tell me his name and that he is at the hospital   Psychiatric:         Mood and Affect: Mood normal.         Behavior: Behavior normal.         Vital Signs  ED Triage Vitals   Temperature Pulse Respirations Blood Pressure SpO2   11/04/23 2232 11/04/23 2232 11/04/23 2232 11/04/23 2240 11/04/23 2232   100 °F (37.8 °C) 71 20 154/64 97 %      Temp Source Heart Rate Source Patient Position - Orthostatic VS BP Location FiO2 (%)   11/04/23 2232 11/04/23 2232 11/04/23 2232 11/04/23 2232 --   Oral Monitor Lying Right arm       Pain Score       11/04/23 2304       Med Not Given for Pain - for MAR use only           Vitals:    11/04/23 2232 11/04/23 2240 11/04/23 2245 11/04/23 2330   BP:  154/64 154/64 170/76   Pulse: 71  70 70   Patient Position - Orthostatic VS: Lying  Lying Lying         Visual Acuity      ED Medications  Medications   acetaminophen (TYLENOL) tablet 975 mg (975 mg Oral Given 11/4/23 2304)       Diagnostic Studies  Results Reviewed Procedure Component Value Units Date/Time    Urine Microscopic [[id number]]  (Abnormal) Collected: 11/04/23 2310    Lab Status: Final result Specimen: Urine, Clean Catch Updated: 11/04/23 2322     RBC, UA 1-2 /hpf      WBC, UA None Seen /hpf      Epithelial Cells Occasional /hpf      Bacteria, UA Occasional /hpf      MUCUS THREADS Occasional    UA w Reflex to Microscopic w Reflex to Culture [[id number]]  (Abnormal) Collected: 11/04/23 2310    Lab Status: Final result Specimen: Urine, Clean Catch Updated: 11/04/23 2316     Color, UA Yellow     Clarity, UA Clear     Specific Gravity, UA 1.020     pH, UA 5.5     Leukocytes, UA Negative     Nitrite, UA Negative     Protein, UA Negative mg/dl      Glucose, UA Negative mg/dl      Ketones, UA Negative mg/dl      Urobilinogen, UA 0.2 E.U./dl      Bilirubin, UA Negative     Occult Blood, UA Trace-Intact                   No orders to display              Procedures  Procedures         ED Course  ED Course as of 11/04/23 2337   Sat Nov 04, 2023 2337 WBC, UA: None Seen  No WBC or BACteria to suggest infection                               SBIRT 20yo+      Flowsheet Row Most Recent Value   Initial Alcohol Screen: US AUDIT-C     1. How often do you have a drink containing alcohol? 0 Filed at: 11/04/2023 2326   2. How many drinks containing alcohol do you have on a typical day you are drinking? 0 Filed at: 11/04/2023 2326   3a. Male UNDER 65: How often do you have five or more drinks on one occasion? 0 Filed at: 11/04/2023 2326   3b. FEMALE Any Age, or MALE 65+: How often do you have 4 or more drinks on one occassion? 0 Filed at: 11/04/2023 2326   Audit-C Score 0 Filed at: 11/04/2023 2326   LLOYD: How many times in the past year have you. .. Used an illegal drug or used a prescription medication for non-medical reasons? Never Filed at: 11/04/2023 2326                      Medical Decision Making  70-year-old male presenting to ED today for questionable confusion.   Here for me he is not confused. He is following simple commands without any difficulty. Vitals otherwise within normal limits with low-grade fever however this is expected in the setting of COVID infection. We will check a urinary analysis for possible urinary tract infection. I discussed with nursing home facility however that regardless patient may be more confused over the next couple days secondary to the COVID infection especially if he is developing fever. Encouraged antipyretic therapy. If patient does have urinary tract infection in the setting of this we will send back to nursing facility with antibiotic prescription as well. Risk  OTC drugs. Disposition  Final diagnoses:   Dementia ([address])   COVID-19     Time reflects when diagnosis was documented in both MDM as applicable and the Disposition within this note       Time User Action Codes Description Comment    11/4/2023 11:26 PM Luis Hollins Add [F03.90] Dementia ([address])     11/4/2023 11:26 PM Luis Hollins Add [U07.1] COVID-19           ED Disposition       ED Disposition   Discharge    Condition   Stable    Date/Time   Sat Nov 4, 2023 4843    [address] discharge to home/self care. Follow-up Information       Follow up With Specialties Details Why Contact Info Additional Information    [address] Emergency Department Emergency Medicine Go to  If symptoms worsen, As needed Vevay  [address] [facility] Emergency Department, [address] [facility] [address] [place] [address], [facility]    With PCP                 Patient's Medications   Discharge Prescriptions    No medications on file       No discharge procedures on file.     PDMP Review       None            ED Provider  Electronically Signed by             Luis Hollins MD  11/04/23 3904

## 2023-11-06 ENCOUNTER — HOSPITAL ENCOUNTER (INPATIENT)
Facility: HOSPITAL | Age: 88
LOS: 6 days | Discharge: NURSING, REHAB OR LONG-TERM CARE | DRG: 178 | End: 2023-11-13
Attending: EMERGENCY MEDICINE | Admitting: INTERNAL MEDICINE
Payer: MEDICARE

## 2023-11-06 DIAGNOSIS — U07.1 COVID-19 VIRUS INFECTION: ICD-10-CM

## 2023-11-06 DIAGNOSIS — M25.421 ELBOW SWELLING, RIGHT: ICD-10-CM

## 2023-11-06 DIAGNOSIS — I42.9 CARDIOMYOPATHY (HCC): ICD-10-CM

## 2023-11-06 DIAGNOSIS — R45.1 AGITATION: ICD-10-CM

## 2023-11-06 DIAGNOSIS — R79.89 ELEVATED TROPONIN: ICD-10-CM

## 2023-11-06 DIAGNOSIS — E86.0 DEHYDRATION: Primary | ICD-10-CM

## 2023-11-06 DIAGNOSIS — Z87.39 HISTORY OF GOUT: ICD-10-CM

## 2023-11-06 DIAGNOSIS — F03.90 DEMENTIA (HCC): ICD-10-CM

## 2023-11-06 LAB
BASOPHILS # BLD AUTO: 0.03 THOUSANDS/ÂΜL (ref 0–0.1)
BASOPHILS NFR BLD AUTO: 0 % (ref 0–1)
EOSINOPHIL # BLD AUTO: 0.17 THOUSAND/ÂΜL (ref 0–0.61)
EOSINOPHIL NFR BLD AUTO: 3 % (ref 0–6)
ERYTHROCYTE [DISTWIDTH] IN BLOOD BY AUTOMATED COUNT: 13.7 % (ref 11.6–15.1)
HCT VFR BLD AUTO: 41.5 % (ref 36.5–49.3)
HGB BLD-MCNC: 13.7 G/DL (ref 12–17)
IMM GRANULOCYTES # BLD AUTO: 0.02 THOUSAND/UL (ref 0–0.2)
IMM GRANULOCYTES NFR BLD AUTO: 0 % (ref 0–2)
LYMPHOCYTES # BLD AUTO: 0.74 THOUSANDS/ÂΜL (ref 0.6–4.47)
LYMPHOCYTES NFR BLD AUTO: 11 % (ref 14–44)
MCH RBC QN AUTO: 31.9 PG (ref 26.8–34.3)
MCHC RBC AUTO-ENTMCNC: 33 G/DL (ref 31.4–37.4)
MCV RBC AUTO: 97 FL (ref 82–98)
MONOCYTES # BLD AUTO: 0.94 THOUSAND/ÂΜL (ref 0.17–1.22)
MONOCYTES NFR BLD AUTO: 14 % (ref 4–12)
NEUTROPHILS # BLD AUTO: 4.9 THOUSANDS/ÂΜL (ref 1.85–7.62)
NEUTS SEG NFR BLD AUTO: 72 % (ref 43–75)
NRBC BLD AUTO-RTO: 0 /100 WBCS
PLATELET # BLD AUTO: 130 THOUSANDS/UL (ref 149–390)
PMV BLD AUTO: 12.1 FL (ref 8.9–12.7)
RBC # BLD AUTO: 4.3 MILLION/UL (ref 3.88–5.62)
WBC # BLD AUTO: 6.8 THOUSAND/UL (ref 4.31–10.16)

## 2023-11-06 PROCEDURE — 86140 C-REACTIVE PROTEIN: CPT | Performed by: NURSE PRACTITIONER

## 2023-11-06 PROCEDURE — 83880 ASSAY OF NATRIURETIC PEPTIDE: CPT | Performed by: NURSE PRACTITIONER

## 2023-11-06 PROCEDURE — 36415 COLL VENOUS BLD VENIPUNCTURE: CPT | Performed by: EMERGENCY MEDICINE

## 2023-11-06 PROCEDURE — 80053 COMPREHEN METABOLIC PANEL: CPT | Performed by: EMERGENCY MEDICINE

## 2023-11-06 PROCEDURE — 99284 EMERGENCY DEPT VISIT MOD MDM: CPT

## 2023-11-06 PROCEDURE — 83735 ASSAY OF MAGNESIUM: CPT | Performed by: EMERGENCY MEDICINE

## 2023-11-06 PROCEDURE — 85025 COMPLETE CBC W/AUTO DIFF WBC: CPT | Performed by: EMERGENCY MEDICINE

## 2023-11-06 PROCEDURE — 82550 ASSAY OF CK (CPK): CPT | Performed by: NURSE PRACTITIONER

## 2023-11-07 ENCOUNTER — APPOINTMENT (INPATIENT)
Dept: RADIOLOGY | Facility: HOSPITAL | Age: 88
DRG: 178 | End: 2023-11-07
Payer: MEDICARE

## 2023-11-07 ENCOUNTER — APPOINTMENT (INPATIENT)
Dept: NON INVASIVE DIAGNOSTICS | Facility: HOSPITAL | Age: 88
DRG: 178 | End: 2023-11-07
Payer: MEDICARE

## 2023-11-07 ENCOUNTER — APPOINTMENT (OUTPATIENT)
Dept: RADIOLOGY | Facility: HOSPITAL | Age: 88
DRG: 178 | End: 2023-11-07
Payer: MEDICARE

## 2023-11-07 PROBLEM — R31.29 MICROSCOPIC HEMATURIA: Status: ACTIVE | Noted: 2023-11-07

## 2023-11-07 PROBLEM — R79.89 ELEVATED TROPONIN: Status: ACTIVE | Noted: 2023-11-07

## 2023-11-07 PROBLEM — R79.89 ELEVATED BRAIN NATRIURETIC PEPTIDE (BNP) LEVEL: Status: ACTIVE | Noted: 2023-11-07

## 2023-11-07 PROBLEM — G93.40 ACUTE ENCEPHALOPATHY: Status: ACTIVE | Noted: 2023-11-07

## 2023-11-07 PROBLEM — F03.90 DEMENTIA (HCC): Status: ACTIVE | Noted: 2023-11-07

## 2023-11-07 PROBLEM — R74.01 ELEVATED AST (SGOT): Status: ACTIVE | Noted: 2023-11-07

## 2023-11-07 PROBLEM — U07.1 COVID-19: Status: ACTIVE | Noted: 2023-11-07

## 2023-11-07 PROBLEM — N18.9 ACUTE KIDNEY INJURY SUPERIMPOSED ON CKD: Status: ACTIVE | Noted: 2023-11-07

## 2023-11-07 PROBLEM — N17.9 AKI (ACUTE KIDNEY INJURY) (HCC): Status: ACTIVE | Noted: 2023-11-07

## 2023-11-07 LAB
ALBUMIN SERPL BCP-MCNC: 3.2 G/DL (ref 3.5–5)
ALBUMIN SERPL BCP-MCNC: 3.7 G/DL (ref 3.5–5)
ALP SERPL-CCNC: 50 U/L (ref 34–104)
ALP SERPL-CCNC: 55 U/L (ref 34–104)
ALT SERPL W P-5'-P-CCNC: 22 U/L (ref 7–52)
ALT SERPL W P-5'-P-CCNC: 24 U/L (ref 7–52)
ANION GAP SERPL CALCULATED.3IONS-SCNC: 11 MMOL/L
ANION GAP SERPL CALCULATED.3IONS-SCNC: 9 MMOL/L
AORTIC ROOT: 3.3 CM
AORTIC VALVE MEAN VELOCITY: 15.4 M/S
APICAL FOUR CHAMBER EJECTION FRACTION: 43 %
AST SERPL W P-5'-P-CCNC: 42 U/L (ref 13–39)
AST SERPL W P-5'-P-CCNC: 47 U/L (ref 13–39)
AV AREA BY CONTINUOUS VTI: 1.5 CM2
AV AREA PEAK VELOCITY: 1.4 CM2
AV LVOT MEAN GRADIENT: 2 MMHG
AV LVOT PEAK GRADIENT: 4 MMHG
AV MEAN GRADIENT: 11 MMHG
AV PEAK GRADIENT: 23 MMHG
AV VALVE AREA: 1.47 CM2
AV VELOCITY RATIO: 0.44
BACTERIA UR QL AUTO: ABNORMAL /HPF
BASOPHILS # BLD AUTO: 0.02 THOUSANDS/ÂΜL (ref 0–0.1)
BASOPHILS NFR BLD AUTO: 0 % (ref 0–1)
BILIRUB SERPL-MCNC: 0.35 MG/DL (ref 0.2–1)
BILIRUB SERPL-MCNC: 0.48 MG/DL (ref 0.2–1)
BILIRUB UR QL STRIP: NEGATIVE
BNP SERPL-MCNC: 416 PG/ML (ref 0–100)
BUN SERPL-MCNC: 38 MG/DL (ref 5–25)
BUN SERPL-MCNC: 40 MG/DL (ref 5–25)
CALCIUM ALBUM COR SERPL-MCNC: 8.1 MG/DL (ref 8.3–10.1)
CALCIUM SERPL-MCNC: 7.5 MG/DL (ref 8.4–10.2)
CALCIUM SERPL-MCNC: 8.1 MG/DL (ref 8.4–10.2)
CARDIAC TROPONIN I PNL SERPL HS: 550 NG/L (ref 8–18)
CARDIAC TROPONIN I PNL SERPL HS: 948 NG/L (ref 8–18)
CARDIAC TROPONIN I PNL SERPL HS: 986 NG/L (ref 8–18)
CHLORIDE SERPL-SCNC: 105 MMOL/L (ref 96–108)
CHLORIDE SERPL-SCNC: 108 MMOL/L (ref 96–108)
CK SERPL-CCNC: 352 U/L (ref 39–308)
CK SERPL-CCNC: 404 U/L (ref 39–308)
CLARITY UR: CLEAR
CO2 SERPL-SCNC: 20 MMOL/L (ref 21–32)
CO2 SERPL-SCNC: 21 MMOL/L (ref 21–32)
COLOR UR: YELLOW
CREAT SERPL-MCNC: 1.78 MG/DL (ref 0.6–1.3)
CREAT SERPL-MCNC: 2.02 MG/DL (ref 0.6–1.3)
CRP SERPL QL: 91 MG/L
D DIMER PPP FEU-MCNC: 1.58 UG/ML FEU
DOP CALC AO PEAK VEL: 2.39 M/S
DOP CALC AO VTI: 47.78 CM
DOP CALC LVOT AREA: 3.14 CM2
DOP CALC LVOT CARDIAC INDEX: 2.91 L/MIN/M2
DOP CALC LVOT CARDIAC OUTPUT: 4.74 L/MIN
DOP CALC LVOT DIAMETER: 2 CM
DOP CALC LVOT PEAK VEL VTI: 22.33 CM
DOP CALC LVOT PEAK VEL: 1.05 M/S
DOP CALC LVOT STROKE INDEX: 41.7 ML/M2
DOP CALC LVOT STROKE VOLUME: 70.12
DOP CALC MV VTI: 35.78 CM
E WAVE DECELERATION TIME: 294 MS
E/A RATIO: 0.74
EOSINOPHIL # BLD AUTO: 0 THOUSAND/ÂΜL (ref 0–0.61)
EOSINOPHIL NFR BLD AUTO: 0 % (ref 0–6)
ERYTHROCYTE [DISTWIDTH] IN BLOOD BY AUTOMATED COUNT: 13.6 % (ref 11.6–15.1)
FLUAV RNA RESP QL NAA+PROBE: NEGATIVE
FLUBV RNA RESP QL NAA+PROBE: NEGATIVE
FRACTIONAL SHORTENING: 28 (ref 28–44)
GFR SERPL CREATININE-BSD FRML MDRD: 28 ML/MIN/1.73SQ M
GFR SERPL CREATININE-BSD FRML MDRD: 33 ML/MIN/1.73SQ M
GLUCOSE SERPL-MCNC: 81 MG/DL (ref 65–140)
GLUCOSE SERPL-MCNC: 90 MG/DL (ref 65–140)
GLUCOSE UR STRIP-MCNC: NEGATIVE MG/DL
HCT VFR BLD AUTO: 37 % (ref 36.5–49.3)
HGB BLD-MCNC: 12.4 G/DL (ref 12–17)
HGB UR QL STRIP.AUTO: ABNORMAL
IMM GRANULOCYTES # BLD AUTO: 0.02 THOUSAND/UL (ref 0–0.2)
IMM GRANULOCYTES NFR BLD AUTO: 0 % (ref 0–2)
INTERVENTRICULAR SEPTUM IN DIASTOLE (PARASTERNAL SHORT AXIS VIEW): 1.2 CM
INTERVENTRICULAR SEPTUM: 1.2 CM (ref 0.6–1.1)
KETONES UR STRIP-MCNC: NEGATIVE MG/DL
LAAS-AP2: 16.4 CM2
LAAS-AP4: 16.8 CM2
LEFT ATRIUM SIZE: 3.6 CM
LEFT ATRIUM VOLUME (MOD BIPLANE): 49 ML
LEFT ATRIUM VOLUME INDEX (MOD BIPLANE): 30.1 ML/M2
LEFT INTERNAL DIMENSION IN SYSTOLE: 2.6 CM (ref 2.1–4)
LEFT VENTRICULAR INTERNAL DIMENSION IN DIASTOLE: 3.6 CM (ref 3.5–6)
LEFT VENTRICULAR POSTERIOR WALL IN END DIASTOLE: 1.1 CM
LEFT VENTRICULAR STROKE VOLUME: 29 ML
LEUKOCYTE ESTERASE UR QL STRIP: NEGATIVE
LVSV (TEICH): 29 ML
LYMPHOCYTES # BLD AUTO: 0.84 THOUSANDS/ÂΜL (ref 0.6–4.47)
LYMPHOCYTES NFR BLD AUTO: 18 % (ref 14–44)
MAGNESIUM SERPL-MCNC: 1.9 MG/DL (ref 1.9–2.7)
MCH RBC QN AUTO: 32.4 PG (ref 26.8–34.3)
MCHC RBC AUTO-ENTMCNC: 33.5 G/DL (ref 31.4–37.4)
MCV RBC AUTO: 97 FL (ref 82–98)
MONOCYTES # BLD AUTO: 0.66 THOUSAND/ÂΜL (ref 0.17–1.22)
MONOCYTES NFR BLD AUTO: 14 % (ref 4–12)
MV E'TISSUE VEL-SEP: 6 CM/S
MV MEAN GRADIENT: 3 MMHG
MV PEAK A VEL: 1.16 M/S
MV PEAK E VEL: 86 CM/S
MV PEAK GRADIENT: 8 MMHG
MV STENOSIS PRESSURE HALF TIME: 85 MS
MV VALVE AREA BY CONTINUITY EQUATION: 1.96 CM2
MV VALVE AREA P 1/2 METHOD: 2.59
NEUTROPHILS # BLD AUTO: 3.21 THOUSANDS/ÂΜL (ref 1.85–7.62)
NEUTS SEG NFR BLD AUTO: 68 % (ref 43–75)
NITRITE UR QL STRIP: NEGATIVE
NON-SQ EPI CELLS URNS QL MICRO: ABNORMAL /HPF
NRBC BLD AUTO-RTO: 0 /100 WBCS
PH UR STRIP.AUTO: 5.5 [PH]
PLATELET # BLD AUTO: 109 THOUSANDS/UL (ref 149–390)
PMV BLD AUTO: 12.3 FL (ref 8.9–12.7)
POTASSIUM SERPL-SCNC: 4 MMOL/L (ref 3.5–5.3)
POTASSIUM SERPL-SCNC: 4.3 MMOL/L (ref 3.5–5.3)
PROCALCITONIN SERPL-MCNC: 1.27 NG/ML
PROT SERPL-MCNC: 5.6 G/DL (ref 6.4–8.4)
PROT SERPL-MCNC: 6.5 G/DL (ref 6.4–8.4)
PROT UR STRIP-MCNC: ABNORMAL MG/DL
RBC # BLD AUTO: 3.83 MILLION/UL (ref 3.88–5.62)
RBC #/AREA URNS AUTO: ABNORMAL /HPF
RIGHT ATRIUM AREA SYSTOLE A4C: 13.7 CM2
RIGHT VENTRICLE ID DIMENSION: 3.1 CM
RSV RNA RESP QL NAA+PROBE: NEGATIVE
SARS-COV-2 RNA RESP QL NAA+PROBE: POSITIVE
SL CV LEFT ATRIUM LENGTH A2C: 4.5 CM
SL CV LV EF: 50
SL CV PED ECHO LEFT VENTRICLE DIASTOLIC VOLUME (MOD BIPLANE) 2D: 54 ML
SL CV PED ECHO LEFT VENTRICLE SYSTOLIC VOLUME (MOD BIPLANE) 2D: 25 ML
SODIUM SERPL-SCNC: 136 MMOL/L (ref 135–147)
SODIUM SERPL-SCNC: 138 MMOL/L (ref 135–147)
SP GR UR STRIP.AUTO: >=1.03 (ref 1–1.03)
TR MAX PG: 28 MMHG
TR PEAK VELOCITY: 2.7 M/S
TRICUSPID VALVE PEAK REGURGITATION VELOCITY: 2.66 M/S
UROBILINOGEN UR QL STRIP.AUTO: 0.2 E.U./DL
WBC # BLD AUTO: 4.75 THOUSAND/UL (ref 4.31–10.16)
WBC #/AREA URNS AUTO: ABNORMAL /HPF

## 2023-11-07 PROCEDURE — 93005 ELECTROCARDIOGRAM TRACING: CPT

## 2023-11-07 PROCEDURE — 93321 DOPPLER ECHO F-UP/LMTD STD: CPT

## 2023-11-07 PROCEDURE — 99222 1ST HOSP IP/OBS MODERATE 55: CPT | Performed by: INTERNAL MEDICINE

## 2023-11-07 PROCEDURE — 81001 URINALYSIS AUTO W/SCOPE: CPT | Performed by: EMERGENCY MEDICINE

## 2023-11-07 PROCEDURE — XW033E5 INTRODUCTION OF REMDESIVIR ANTI-INFECTIVE INTO PERIPHERAL VEIN, PERCUTANEOUS APPROACH, NEW TECHNOLOGY GROUP 5: ICD-10-PCS | Performed by: INTERNAL MEDICINE

## 2023-11-07 PROCEDURE — 93308 TTE F-UP OR LMTD: CPT | Performed by: INTERNAL MEDICINE

## 2023-11-07 PROCEDURE — G1004 CDSM NDSC: HCPCS

## 2023-11-07 PROCEDURE — 71045 X-RAY EXAM CHEST 1 VIEW: CPT

## 2023-11-07 PROCEDURE — 82550 ASSAY OF CK (CPK): CPT | Performed by: NURSE PRACTITIONER

## 2023-11-07 PROCEDURE — 80053 COMPREHEN METABOLIC PANEL: CPT | Performed by: NURSE PRACTITIONER

## 2023-11-07 PROCEDURE — 70450 CT HEAD/BRAIN W/O DYE: CPT

## 2023-11-07 PROCEDURE — 93325 DOPPLER ECHO COLOR FLOW MAPG: CPT | Performed by: INTERNAL MEDICINE

## 2023-11-07 PROCEDURE — 85379 FIBRIN DEGRADATION QUANT: CPT | Performed by: INTERNAL MEDICINE

## 2023-11-07 PROCEDURE — 84484 ASSAY OF TROPONIN QUANT: CPT | Performed by: NURSE PRACTITIONER

## 2023-11-07 PROCEDURE — 0241U HB NFCT DS VIR RESP RNA 4 TRGT: CPT | Performed by: NURSE PRACTITIONER

## 2023-11-07 PROCEDURE — 85025 COMPLETE CBC W/AUTO DIFF WBC: CPT | Performed by: NURSE PRACTITIONER

## 2023-11-07 PROCEDURE — 93321 DOPPLER ECHO F-UP/LMTD STD: CPT | Performed by: INTERNAL MEDICINE

## 2023-11-07 PROCEDURE — 84145 PROCALCITONIN (PCT): CPT | Performed by: INTERNAL MEDICINE

## 2023-11-07 PROCEDURE — 99285 EMERGENCY DEPT VISIT HI MDM: CPT | Performed by: EMERGENCY MEDICINE

## 2023-11-07 PROCEDURE — 87081 CULTURE SCREEN ONLY: CPT | Performed by: INTERNAL MEDICINE

## 2023-11-07 PROCEDURE — 36415 COLL VENOUS BLD VENIPUNCTURE: CPT | Performed by: NURSE PRACTITIONER

## 2023-11-07 PROCEDURE — C8924 2D TTE W OR W/O FOL W/CON,FU: HCPCS

## 2023-11-07 PROCEDURE — 84484 ASSAY OF TROPONIN QUANT: CPT | Performed by: INTERNAL MEDICINE

## 2023-11-07 PROCEDURE — 93325 DOPPLER ECHO COLOR FLOW MAPG: CPT

## 2023-11-07 RX ORDER — DICYCLOMINE HYDROCHLORIDE 10 MG/1
10 CAPSULE ORAL
Status: DISCONTINUED | OUTPATIENT
Start: 2023-11-07 | End: 2023-11-13 | Stop reason: HOSPADM

## 2023-11-07 RX ORDER — LOSARTAN POTASSIUM 25 MG/1
25 TABLET ORAL DAILY
Status: DISCONTINUED | OUTPATIENT
Start: 2023-11-07 | End: 2023-11-07

## 2023-11-07 RX ORDER — HALOPERIDOL 5 MG/ML
5 INJECTION INTRAMUSCULAR ONCE
Status: COMPLETED | OUTPATIENT
Start: 2023-11-07 | End: 2023-11-07

## 2023-11-07 RX ORDER — ALLOPURINOL 100 MG/1
100 TABLET ORAL DAILY
Status: DISCONTINUED | OUTPATIENT
Start: 2023-11-07 | End: 2023-11-13 | Stop reason: HOSPADM

## 2023-11-07 RX ORDER — ACETAMINOPHEN 325 MG/1
650 TABLET ORAL EVERY 6 HOURS PRN
Status: DISCONTINUED | OUTPATIENT
Start: 2023-11-07 | End: 2023-11-13 | Stop reason: HOSPADM

## 2023-11-07 RX ORDER — DIVALPROEX SODIUM 250 MG/1
250 TABLET, DELAYED RELEASE ORAL EVERY 12 HOURS SCHEDULED
Status: DISCONTINUED | OUTPATIENT
Start: 2023-11-07 | End: 2023-11-11

## 2023-11-07 RX ORDER — PRAVASTATIN SODIUM 20 MG
20 TABLET ORAL
Status: DISCONTINUED | OUTPATIENT
Start: 2023-11-07 | End: 2023-11-13 | Stop reason: HOSPADM

## 2023-11-07 RX ORDER — HEPARIN SODIUM 5000 [USP'U]/ML
5000 INJECTION, SOLUTION INTRAVENOUS; SUBCUTANEOUS EVERY 8 HOURS SCHEDULED
Status: DISCONTINUED | OUTPATIENT
Start: 2023-11-07 | End: 2023-11-13 | Stop reason: HOSPADM

## 2023-11-07 RX ORDER — ONDANSETRON 2 MG/ML
4 INJECTION INTRAMUSCULAR; INTRAVENOUS EVERY 6 HOURS PRN
Status: DISCONTINUED | OUTPATIENT
Start: 2023-11-07 | End: 2023-11-13 | Stop reason: HOSPADM

## 2023-11-07 RX ORDER — DONEPEZIL HYDROCHLORIDE 5 MG/1
5 TABLET, FILM COATED ORAL
Status: DISCONTINUED | OUTPATIENT
Start: 2023-11-07 | End: 2023-11-13 | Stop reason: HOSPADM

## 2023-11-07 RX ORDER — SODIUM CHLORIDE 9 MG/ML
60 INJECTION, SOLUTION INTRAVENOUS CONTINUOUS
Status: DISCONTINUED | OUTPATIENT
Start: 2023-11-07 | End: 2023-11-08

## 2023-11-07 RX ORDER — GUAIFENESIN 600 MG/1
600 TABLET, EXTENDED RELEASE ORAL EVERY 12 HOURS SCHEDULED
Status: DISCONTINUED | OUTPATIENT
Start: 2023-11-07 | End: 2023-11-11

## 2023-11-07 RX ORDER — AZITHROMYCIN 250 MG/1
500 TABLET, FILM COATED ORAL EVERY 24 HOURS
Status: COMPLETED | OUTPATIENT
Start: 2023-11-07 | End: 2023-11-09

## 2023-11-07 RX ADMIN — HEPARIN SODIUM 5000 UNITS: 5000 INJECTION INTRAVENOUS; SUBCUTANEOUS at 14:01

## 2023-11-07 RX ADMIN — DICYCLOMINE HYDROCHLORIDE 10 MG: 10 CAPSULE ORAL at 08:00

## 2023-11-07 RX ADMIN — GUAIFENESIN 600 MG: 600 TABLET, EXTENDED RELEASE ORAL at 03:24

## 2023-11-07 RX ADMIN — SODIUM CHLORIDE 500 ML: 0.9 INJECTION, SOLUTION INTRAVENOUS at 01:27

## 2023-11-07 RX ADMIN — SODIUM CHLORIDE 60 ML/HR: 0.9 INJECTION, SOLUTION INTRAVENOUS at 05:55

## 2023-11-07 RX ADMIN — PRAVASTATIN SODIUM 20 MG: 20 TABLET ORAL at 17:23

## 2023-11-07 RX ADMIN — DICYCLOMINE HYDROCHLORIDE 10 MG: 10 CAPSULE ORAL at 17:23

## 2023-11-07 RX ADMIN — GUAIFENESIN 600 MG: 600 TABLET, EXTENDED RELEASE ORAL at 09:00

## 2023-11-07 RX ADMIN — HEPARIN SODIUM 5000 UNITS: 5000 INJECTION INTRAVENOUS; SUBCUTANEOUS at 06:27

## 2023-11-07 RX ADMIN — AZITHROMYCIN DIHYDRATE 500 MG: 250 TABLET ORAL at 03:24

## 2023-11-07 RX ADMIN — CYANOCOBALAMIN TAB 500 MCG 1000 MCG: 500 TAB at 10:30

## 2023-11-07 RX ADMIN — DIVALPROEX SODIUM 250 MG: 250 TABLET, DELAYED RELEASE ORAL at 08:00

## 2023-11-07 RX ADMIN — PERFLUTREN 1.2 ML/MIN: 6.52 INJECTION, SUSPENSION INTRAVENOUS at 14:10

## 2023-11-07 RX ADMIN — GUAIFENESIN 600 MG: 600 TABLET, EXTENDED RELEASE ORAL at 20:19

## 2023-11-07 RX ADMIN — DIVALPROEX SODIUM 250 MG: 250 TABLET, DELAYED RELEASE ORAL at 20:19

## 2023-11-07 RX ADMIN — DONEPEZIL HYDROCHLORIDE 5 MG: 5 TABLET ORAL at 21:36

## 2023-11-07 RX ADMIN — REMDESIVIR 200 MG: 100 INJECTION, POWDER, LYOPHILIZED, FOR SOLUTION INTRAVENOUS at 04:06

## 2023-11-07 RX ADMIN — ALLOPURINOL 100 MG: 100 TABLET ORAL at 10:30

## 2023-11-07 RX ADMIN — HEPARIN SODIUM 5000 UNITS: 5000 INJECTION INTRAVENOUS; SUBCUTANEOUS at 21:36

## 2023-11-07 RX ADMIN — HALOPERIDOL LACTATE 5 MG: 5 INJECTION, SOLUTION INTRAMUSCULAR at 04:02

## 2023-11-07 NOTE — ASSESSMENT & PLAN NOTE
Initially diagnosed on 11/4 at assisted living facility  Patient continues to be asymptomatic from respiratory standpoint and stable on room air. Encephalopathy is improving  Continue IV remdesivir as patient is high risk given his dementia, comorbidities, and age. Contain contact and airborne precautions.

## 2023-11-07 NOTE — ED NOTES
Patient going to ICU bed 7. Called to give report. Vernon Wade RN will call back shortly when available.      Heidi Figueroa RN  11/07/23 0635

## 2023-11-07 NOTE — ASSESSMENT & PLAN NOTE
Patient presents with agitation, aggressive and combative behavior from assisted living. Patient was tested COVID-positive on 11/4th in assisted living. Patient was seen in ED on 11/4 for altered mental status. Was tested positive for COVID that day. Patient was walking around confused, knocking on doors and not acting like himself. Patient was discharged back to [facility]. Patient was not prescribed Paxlovid. Unsure if patient received COVID-vaccine in assisted living facility. No information on transfer forms. UA via straight cath in ED + for blood and RBC, negative for nitrite and leukocytes  Chest x-ray appears unremarkable to me, pending final read  Will repeat COVID test along with flu RSV  Check CT head for completeness  Likely due to COVID-19. Treatment as below.   Neurochecks for 1 day

## 2023-11-07 NOTE — ASSESSMENT & PLAN NOTE
Prerenal etiology in the setting of COVID-19 infection and decreased p.o. intake  Renal function continues to improve and is now near baseline  Patient tolerating p.o. intake; will discontinue IV fluids  Recheck BMP in the morning

## 2023-11-07 NOTE — ASSESSMENT & PLAN NOTE
. No edema on extremities. Chest x-ray appears unremarkable to me, pending final read  2D echo in April 2023 showed EF 61%, grade 1 diastolic dysfunction, moderate MR, mild to moderate TR, small pericardial effusion.   Likely due to COVID 19  Telemetry  Update 2D echo

## 2023-11-07 NOTE — ASSESSMENT & PLAN NOTE
UA 2 days ago only showed trace blood.   Monitor for overt hematuria  Possible secondary to straight cath

## 2023-11-07 NOTE — ASSESSMENT & PLAN NOTE
Patient with baseline dementia but per family is generally cooperative, mild-mannered. Lives at assisted living facility, was sent to the ER after he became combative agitated combative and agitated. Diagnosed with COVID prior to arrival on 11/4    Encephalopathy appears to be secondary to his COVID-19 infection  Continue IV remdesivir for his COVID as he is high risk, however otherwise has been stable from a respiratory standpoint  He also had a markedly elevated troponin on admission, and some new changes on echocardiogram.  He may have had a cardiac event prior to arrival contributing to his encephalopathy. We will have cardiology evaluate the patient.   We will check B12, TSH, and Depakote levels
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
## 2023-11-07 NOTE — OCCUPATIONAL THERAPY NOTE
OT EVALUATION       11/07/23 1319   Note Type   Note type Cancelled Session   Cancel Reasons Medical status   Additional Comments cancelled by nurse due to elevated troponins and need ffor ECHO and blood work, will follow as medically appropriate.    Licensure   [place] License Number  Laina Chairez [id number] [facility] OTR/L [id number]

## 2023-11-07 NOTE — ASSESSMENT & PLAN NOTE
History of CKD 3, baseline creatinine appears to be around 1.4. Suspect prerenal  Unsure amount of straight cath in ED.   Trial IV hydration  Hold losartan  Avoid nephrotoxin and hypotension  Repeat lab in the morning

## 2023-11-07 NOTE — ASSESSMENT & PLAN NOTE
. No edema on extremities. Chest x-ray appears unremarkable to me, pending final read  2D echo in April 2023 showed EF 71%, grade 1 diastolic dysfunction, moderate MR, mild to moderate TR, small pericardial effusion.   Likely due to COVID 19  Telemetry  Update 2D echo
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
## 2023-11-07 NOTE — NURSING NOTE
Pt Discharged to home with her own meds that were held in pharmacy, returned to pt. No questions about discharge instructions. IV removed. All belongings with pt. Pt walked to lobby with her rolling walker, myself and her grand daughter.

## 2023-11-07 NOTE — ASSESSMENT & PLAN NOTE
Troponin around 950 on admission but was asymptomatic from a cardiac standpoint  He did have some a follow-up echocardiogram does show some new changes with a mild drop in EF as well as possible wall motion abnormalities. This suggest patient may have had cardiac event prior to arrival.     we will have cardiology evaluate the patient.

## 2023-11-07 NOTE — PHYSICAL THERAPY NOTE
11/07/23 1320   Note Type   Note type Cancelled Session   Cancel Reasons Other   Additional Comments PT orders received and chart reviewed. Per Deandre Aguirre RN, pt not appropriate for PT eval/treat at present. Pt with elevated tropinins and staff from Echo just arrived at pt's room. Will follow.    Licensure   [place] License Number  [id number] [place], [place] [id number]

## 2023-11-07 NOTE — ASSESSMENT & PLAN NOTE
Troponin 948. Patient denies chest pain. History of CAD.   Stat EKG showed V paced rhythm  We will trend troponin telemetry  Check 2D echo

## 2023-11-07 NOTE — CASE MANAGEMENT
Case Management Assessment & Discharge Planning Note    Patient name Maria Teresa Paz  Location [address] [place] [address] MRN [id number]  : 1935 Date 2023       Current Admission Date: 2023  Current Admission Diagnosis:Acute encephalopathy   Patient Active Problem List    Diagnosis Date Noted    Acute kidney injury superimposed on CKD  2023    COVID-19 2023    Acute encephalopathy 2023    Dementia ([address]) 2023    Elevated AST (SGOT) 2023    Microscopic hematuria 2023    Elevated brain natriuretic peptide (BNP) level 2023    Elevated troponin 2023    Pacemaker 2023    MI (myocardial infarction) ([address]) 2023    Second degree heart block by electrocardiogram (ECG) 2023    Bradycardia 2023    History of heart attack 2023    Resistant hypertension 2023    Essential hypertension 2023    Hyperlipidemia 2023    History of gout 2023    Moderate dementia without behavioral disturbance, psychotic disturbance, mood disturbance, or anxiety ([address]) 2023    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    GERD without esophagitis 2023    Bilateral sensorineural hearing loss 2023    Vitamin D deficiency 2023    Bilateral carotid artery stenosis 2023    Stage 3 chronic kidney disease ([address]) 2023    Iron deficiency anemia 2023    Cobalamin deficiency 2023      LOS (days): 0  Geometric Mean LOS (GMLOS) (days):   Days to GMLOS:     OBJECTIVE:    Risk of Unplanned Readmission Score: 21.85     Current admission status: Inpatient    Preferred Pharmacy:   [facility] - [address]  [address]  [address] [address]  Phone: [phone] Fax: [phone]    Primary Care Provider: No primary care provider on file.     Primary Insurance: MEDICARE  Secondary Insurance: BLUE CROSS    ASSESSMENT:  Active Health Care Proxies    There are no active Health Care Proxies on file. Readmission Root Cause  30 Day Readmission: No    Patient Information  Admitted from[de-identified] Facility (Patient resides at [address] [facility])  Mental Status: Confused (Patient has dementia, assessment completed by speaking with Daughter Js Daily)  Assessment information provided by[de-identified] Daughter  Primary Caregiver: Child  Caregiver's Name[de-identified] Daughter Dema Mohs Relationship to Patient[de-identified] Family Member  Caregiver's Telephone Number[de-identified] [phone]  Support Systems: Spouse/significant other, Daughter  [place] of Residence: [address] [place] [address] do you live in?: [facility] entry access options.  Select all that apply.: No steps to enter home  Type of Current Residence: Facility ([facility])  Upon entering residence, is there a bedroom on the main floor (no further steps)?: Yes  Upon entering residence, is there a bathroom on the main floor (no further steps)?: Yes  In the last 12 months, was there a time when you were not able to pay the mortgage or rent on time?: No  In the last 12 months, was there a time when you did not have a steady place to sleep or slept in a shelter (including now)?: No  Homeless/housing insecurity resource given?: N/A  Living Arrangements: Other (Comment) ([facility])    Activities of Daily Living Prior to Admission  Functional Status: Independent  Completes ADLs independently?: Yes  Ambulates independently?: Yes    Patient Information Continued  Income Source: Pension/USP  Does patient have prescription coverage?: Yes  Within the past 12 months, you worried that your food would run out before you got the money to buy more.: Never true  Within the past 12 months, the food you bought just didn't last and you didn't have money to get more.: Never true  Food insecurity resource given?: N/A  Does patient receive dialysis treatments?: No  Does patient have a history of Mental Health Diagnosis?: Yes (Patient has dementia.)  Is patient receiving treatment for mental health?: Yes      Means of Transportation  In the past 12 months, has lack of transportation kept you from medical appointments or from getting medications?: No  In the past 12 months, has lack of transportation kept you from meetings, work, or from getting things needed for daily living?: No  Was application for public transport provided?: N/A    DISCHARGE DETAILS:    Discharge planning discussed with[de-identified] Daughter Samantha Stark of Choice: Yes     CM contacted family/caregiver?: Yes    Contacts  Patient Contacts: Daughter Taj Marroquin  Relationship to Patient[de-identified] Family  Contact Method: Phone  Phone Number: [phone]  Reason/Outcome: Continuity of Care, Discharge Planning      CM called and spoke with patient's Daughter Taj Marroquin to introduce role, complete assessment, and confirm plan for patient's discharge. Taj Marroquin stated ideally the plan is for patient to return to [address] [place] [address], however the facility has concerns and may not allow patient to return due to his recent behavior. Taj Marroquin requested that patient have a psych evaluation completed and stated it is needed for him to be able to return to [facility]. MAYTE made Taj Marroquin she will follow up on what orders have been placed to confirm if a psych order has been placed. CM made physician aware of Roshni's concerns and request for a psych evaluation.

## 2023-11-07 NOTE — ASSESSMENT & PLAN NOTE
History of dementia but is typically mild-mannered and well behaved at the assisted living facility where he resides  Became combative and agitated prompting his visit to the ER. Also has COVID. Seen by psychiatry while inpatient; no new changes recommended, they recommended outpatient follow-up with psychiatry. Family working on transitioning patient to a longer term care facility.     Will likely need rehab on discharge  Continue home Depakote and Aricept

## 2023-11-07 NOTE — PROGRESS NOTES
Metallic object in the left external auditory canal incidentally identified on CT of the head. Visual inspection confirmed foreign body. After discussion with family members, he did use hearing aids but this was approximately 15 years ago. Called the clinic facility where he resides they have no record of any hearing aids. Foreign body embedded in cerumen was able easily removed with hemostat. Please see picture below. Appears to be a battery?

## 2023-11-07 NOTE — ASSESSMENT & PLAN NOTE
Diagnosed on 11/4th in [place]. Was not prescribed Paxlovid. Unsure if patient received COVID vaccine or boosters. Patient on room air, satting mid 90s.  + Cough with thick yellowish phlegm on exam  We will follow mild pathway.   Chest x-ray appears unremarkable, pending final read  Cardiac markers: Troponin 948, ,   CRP 91  Start remdesivir due to high risk  Start Mucinex  Start Zithromax for 3 days for possible bronchitis  Check sputum Gram stain and culture  Incentive spirometer  Out of bed  PT OT  Check CBC with differential, CMP in the morning

## 2023-11-08 ENCOUNTER — TELEPHONE (OUTPATIENT)
Dept: PSYCHIATRY | Facility: CLINIC | Age: 88
End: 2023-11-08

## 2023-11-08 ENCOUNTER — APPOINTMENT (INPATIENT)
Dept: RADIOLOGY | Facility: HOSPITAL | Age: 88
DRG: 178 | End: 2023-11-08
Payer: MEDICARE

## 2023-11-08 LAB
ANION GAP SERPL CALCULATED.3IONS-SCNC: 7 MMOL/L
ATRIAL RATE: 85 BPM
BASOPHILS # BLD AUTO: 0.02 THOUSANDS/ÂΜL (ref 0–0.1)
BASOPHILS NFR BLD AUTO: 1 % (ref 0–1)
BUN SERPL-MCNC: 37 MG/DL (ref 5–25)
CALCIUM SERPL-MCNC: 7.8 MG/DL (ref 8.4–10.2)
CHLORIDE SERPL-SCNC: 113 MMOL/L (ref 96–108)
CO2 SERPL-SCNC: 22 MMOL/L (ref 21–32)
CREAT SERPL-MCNC: 1.54 MG/DL (ref 0.6–1.3)
EOSINOPHIL # BLD AUTO: 0.01 THOUSAND/ÂΜL (ref 0–0.61)
EOSINOPHIL NFR BLD AUTO: 0 % (ref 0–6)
ERYTHROCYTE [DISTWIDTH] IN BLOOD BY AUTOMATED COUNT: 13.7 % (ref 11.6–15.1)
GFR SERPL CREATININE-BSD FRML MDRD: 39 ML/MIN/1.73SQ M
GLUCOSE SERPL-MCNC: 74 MG/DL (ref 65–140)
HCT VFR BLD AUTO: 37.6 % (ref 36.5–49.3)
HGB BLD-MCNC: 12.4 G/DL (ref 12–17)
IMM GRANULOCYTES # BLD AUTO: 0.01 THOUSAND/UL (ref 0–0.2)
IMM GRANULOCYTES NFR BLD AUTO: 0 % (ref 0–2)
LYMPHOCYTES # BLD AUTO: 0.95 THOUSANDS/ÂΜL (ref 0.6–4.47)
LYMPHOCYTES NFR BLD AUTO: 25 % (ref 14–44)
MAGNESIUM SERPL-MCNC: 2 MG/DL (ref 1.9–2.7)
MCH RBC QN AUTO: 32 PG (ref 26.8–34.3)
MCHC RBC AUTO-ENTMCNC: 33 G/DL (ref 31.4–37.4)
MCV RBC AUTO: 97 FL (ref 82–98)
MONOCYTES # BLD AUTO: 0.54 THOUSAND/ÂΜL (ref 0.17–1.22)
MONOCYTES NFR BLD AUTO: 14 % (ref 4–12)
MRSA NOSE QL CULT: NORMAL
NEUTROPHILS # BLD AUTO: 2.23 THOUSANDS/ÂΜL (ref 1.85–7.62)
NEUTS SEG NFR BLD AUTO: 60 % (ref 43–75)
NRBC BLD AUTO-RTO: 0 /100 WBCS
P AXIS: 18 DEGREES
PLATELET # BLD AUTO: 107 THOUSANDS/UL (ref 149–390)
PMV BLD AUTO: 12.1 FL (ref 8.9–12.7)
POTASSIUM SERPL-SCNC: 4 MMOL/L (ref 3.5–5.3)
QRS AXIS: -54 DEGREES
QRSD INTERVAL: 142 MS
QT INTERVAL: 498 MS
QTC INTERVAL: 537 MS
RBC # BLD AUTO: 3.87 MILLION/UL (ref 3.88–5.62)
SODIUM SERPL-SCNC: 142 MMOL/L (ref 135–147)
T WAVE AXIS: -51 DEGREES
TSH SERPL DL<=0.05 MIU/L-ACNC: 2.72 UIU/ML (ref 0.45–4.5)
VALPROATE SERPL-MCNC: 22 UG/ML (ref 50–100)
VENTRICULAR RATE: 70 BPM
VIT B12 SERPL-MCNC: 1039 PG/ML (ref 180–914)
WBC # BLD AUTO: 3.76 THOUSAND/UL (ref 4.31–10.16)

## 2023-11-08 PROCEDURE — 99233 SBSQ HOSP IP/OBS HIGH 50: CPT | Performed by: INTERNAL MEDICINE

## 2023-11-08 PROCEDURE — G0426 INPT/ED TELECONSULT50: HCPCS | Performed by: PSYCHIATRY & NEUROLOGY

## 2023-11-08 PROCEDURE — 93970 EXTREMITY STUDY: CPT | Performed by: SURGERY

## 2023-11-08 PROCEDURE — 80048 BASIC METABOLIC PNL TOTAL CA: CPT | Performed by: INTERNAL MEDICINE

## 2023-11-08 PROCEDURE — 97116 GAIT TRAINING THERAPY: CPT

## 2023-11-08 PROCEDURE — 97163 PT EVAL HIGH COMPLEX 45 MIN: CPT

## 2023-11-08 PROCEDURE — 93010 ELECTROCARDIOGRAM REPORT: CPT | Performed by: INTERNAL MEDICINE

## 2023-11-08 PROCEDURE — 85025 COMPLETE CBC W/AUTO DIFF WBC: CPT | Performed by: INTERNAL MEDICINE

## 2023-11-08 PROCEDURE — 82607 VITAMIN B-12: CPT | Performed by: INTERNAL MEDICINE

## 2023-11-08 PROCEDURE — 80164 ASSAY DIPROPYLACETIC ACD TOT: CPT | Performed by: INTERNAL MEDICINE

## 2023-11-08 PROCEDURE — 84443 ASSAY THYROID STIM HORMONE: CPT | Performed by: INTERNAL MEDICINE

## 2023-11-08 PROCEDURE — 99223 1ST HOSP IP/OBS HIGH 75: CPT | Performed by: INTERNAL MEDICINE

## 2023-11-08 PROCEDURE — 93970 EXTREMITY STUDY: CPT

## 2023-11-08 PROCEDURE — 97167 OT EVAL HIGH COMPLEX 60 MIN: CPT

## 2023-11-08 PROCEDURE — 83735 ASSAY OF MAGNESIUM: CPT | Performed by: NURSE PRACTITIONER

## 2023-11-08 RX ORDER — LOSARTAN POTASSIUM 25 MG/1
25 TABLET ORAL DAILY
Status: DISCONTINUED | OUTPATIENT
Start: 2023-11-08 | End: 2023-11-09

## 2023-11-08 RX ORDER — CARVEDILOL 3.12 MG/1
3.12 TABLET ORAL 2 TIMES DAILY WITH MEALS
Status: DISCONTINUED | OUTPATIENT
Start: 2023-11-08 | End: 2023-11-13 | Stop reason: HOSPADM

## 2023-11-08 RX ORDER — LANOLIN ALCOHOL/MO/W.PET/CERES
3 CREAM (GRAM) TOPICAL
Status: DISCONTINUED | OUTPATIENT
Start: 2023-11-08 | End: 2023-11-10

## 2023-11-08 RX ADMIN — CYANOCOBALAMIN TAB 500 MCG 1000 MCG: 500 TAB at 08:28

## 2023-11-08 RX ADMIN — DICYCLOMINE HYDROCHLORIDE 10 MG: 10 CAPSULE ORAL at 16:08

## 2023-11-08 RX ADMIN — HEPARIN SODIUM 5000 UNITS: 5000 INJECTION INTRAVENOUS; SUBCUTANEOUS at 21:48

## 2023-11-08 RX ADMIN — DONEPEZIL HYDROCHLORIDE 5 MG: 5 TABLET ORAL at 21:48

## 2023-11-08 RX ADMIN — HEPARIN SODIUM 5000 UNITS: 5000 INJECTION INTRAVENOUS; SUBCUTANEOUS at 15:05

## 2023-11-08 RX ADMIN — DICYCLOMINE HYDROCHLORIDE 10 MG: 10 CAPSULE ORAL at 08:28

## 2023-11-08 RX ADMIN — Medication 3 MG: at 22:35

## 2023-11-08 RX ADMIN — ALLOPURINOL 100 MG: 100 TABLET ORAL at 08:28

## 2023-11-08 RX ADMIN — CARVEDILOL 3.12 MG: 3.12 TABLET, FILM COATED ORAL at 16:09

## 2023-11-08 RX ADMIN — DIVALPROEX SODIUM 250 MG: 250 TABLET, DELAYED RELEASE ORAL at 21:48

## 2023-11-08 RX ADMIN — GUAIFENESIN 600 MG: 600 TABLET, EXTENDED RELEASE ORAL at 10:21

## 2023-11-08 RX ADMIN — AZITHROMYCIN DIHYDRATE 500 MG: 250 TABLET ORAL at 03:34

## 2023-11-08 RX ADMIN — PRAVASTATIN SODIUM 20 MG: 20 TABLET ORAL at 16:08

## 2023-11-08 RX ADMIN — REMDESIVIR 100 MG: 100 INJECTION, POWDER, LYOPHILIZED, FOR SOLUTION INTRAVENOUS at 03:52

## 2023-11-08 RX ADMIN — GUAIFENESIN 600 MG: 600 TABLET, EXTENDED RELEASE ORAL at 21:48

## 2023-11-08 RX ADMIN — DIVALPROEX SODIUM 250 MG: 250 TABLET, DELAYED RELEASE ORAL at 08:28

## 2023-11-08 NOTE — PLAN OF CARE
Problem: METABOLIC, FLUID AND ELECTROLYTES - ADULT  Goal: Electrolytes maintained within normal limits  Description: INTERVENTIONS:  - Monitor labs and assess patient for signs and symptoms of electrolyte imbalances  - Administer electrolyte replacement as ordered  - Monitor response to electrolyte replacements, including repeat lab results as appropriate  - Instruct patient on fluid and nutrition as appropriate  Outcome: Progressing  Goal: Fluid balance maintained  Description: INTERVENTIONS:  - Monitor labs   - Monitor I/O and WT  - Instruct patient on fluid and nutrition as appropriate  - Assess for signs & symptoms of volume excess or deficit  Outcome: Progressing  Goal: Glucose maintained within target range  Description: INTERVENTIONS:  - Monitor Blood Glucose as ordered  - Assess for signs and symptoms of hyperglycemia and hypoglycemia  - Administer ordered medications to maintain glucose within target range  - Assess nutritional intake and initiate nutrition service referral as needed  Outcome: Progressing     Problem: RESPIRATORY - ADULT  Goal: Achieves optimal ventilation and oxygenation  Description: INTERVENTIONS:  - Assess for changes in respiratory status  - Assess for changes in mentation and behavior  - Position to facilitate oxygenation and minimize respiratory effort  - Oxygen administered by appropriate delivery if ordered  - Initiate smoking cessation education as indicated  - Encourage broncho-pulmonary hygiene including cough, deep breathe, Incentive Spirometry  - Assess the need for suctioning and aspirate as needed  - Assess and instruct to report SOB or any respiratory difficulty  - Respiratory Therapy support as indicated  Outcome: Progressing     Problem: CARDIOVASCULAR - ADULT  Goal: Maintains optimal cardiac output and hemodynamic stability  Description: INTERVENTIONS:  - Monitor I/O, vital signs and rhythm  - Monitor for S/S and trends of decreased cardiac output  - Administer and titrate ordered vasoactive medications to optimize hemodynamic stability  - Assess quality of pulses, skin color and temperature  - Assess for signs of decreased coronary artery perfusion  - Instruct patient to report change in severity of symptoms  Outcome: Progressing  Goal: Absence of cardiac dysrhythmias or at baseline rhythm  Description: INTERVENTIONS:  - Continuous cardiac monitoring, vital signs, obtain 12 lead EKG if ordered  - Administer antiarrhythmic and heart rate control medications as ordered  - Monitor electrolytes and administer replacement therapy as ordered  Outcome: Progressing

## 2023-11-08 NOTE — CASE MANAGEMENT
Case Management Progress Note    Patient name Gloria Garcia  Location [address] [place] [address] MRN [id number]  : 1935 Date 2023       LOS (days): 1  Geometric Mean LOS (GMLOS) (days): 3.30  Days to GMLOS:1.9        OBJECTIVE:        Current admission status: Inpatient  Preferred Pharmacy:   [facility] - [facility]  [address] [address]  Phone: [phone] Fax: [phone]    Primary Care Provider: No primary care provider on file. Primary Insurance: MEDICARE  Secondary Insurance: BLUE CROSS    PROGRESS NOTE:      CM called and spoke with patient's Daughter Marito Donnelly to provide an update on current available facilities for LTC. CM made Marito Donnelly aware that there are currently two accepting facilities with immediate availability and a third that can accept patient after 11th day from COVID+ test. Marito Donnelly took facility information and then had to get back to work but appreciated the update.

## 2023-11-08 NOTE — TELEPHONE ENCOUNTER
Inaptient psych consult ordered and placed on the [facility] telepsych queue to be seen virtual by an [facility] provider.

## 2023-11-08 NOTE — PLAN OF CARE
Problem: PHYSICAL THERAPY ADULT  Goal: Performs mobility at highest level of function for planned discharge setting. See evaluation for individualized goals. Description: Treatment/Interventions: ADL retraining, Functional transfer training, LE strengthening/ROM, Therapeutic exercise, Endurance training, Cognitive reorientation, Patient/family training, Equipment eval/education, Bed mobility, Gait training, Compensatory technique education, Spoke to case management, OT, Spoke to nursing          See flowsheet documentation for full assessment, interventions and recommendations. Note:    Problem List: Decreased strength, Decreased endurance, Impaired balance, Decreased mobility, Decreased coordination, Decreased cognition, Impaired judgement, Decreased safety awareness, Pain  Assessment: Patient seen for Physical Therapy evaluation. Patient admitted with Acute encephalopathy. Comorbidities affecting patient's physical performance include: HTN, HLD, gout, ANN MARIE on CKD, dementia, bradycardia, RBBB incomplete, MI.  Personal factors affecting patient at time of initial evaluation include: inability to navigate community distances, inability to navigate level surfaces without external assistance, inability to perform dynamic tasks in community, decreased cognition, limited insight into impairments, and inability to live alone. Prior to admission, patient was independent with functional mobility without assistive device, requiring assist for ADLS, requiring assist for IADLS, ambulating household distance, and an assisted living resident.   Please find objective findings from Physical Therapy assessment regarding body systems outlined above with impairments and limitations including weakness, impaired balance, decreased endurance, impaired coordination, gait deviations, pain, decreased activity tolerance, decreased functional mobility tolerance, decreased safety awareness, impaired judgement, fall risk, and decreased cognition. The Barthel Index was used as a functional outcome tool presenting with a score of Barthel Index Score: 45 today indicating marked limitations of functional mobility and ADLS. Patient's clinical presentation is currently unstable/unpredictable as seen in patient's presentation of vital sign response, changing level of pain, varying levels of cognitive performance, increased fall risk, new onset of impairment of functional mobility, decreased endurance, and new onset of weakness. Pt would benefit from continued Physical Therapy treatment to address deficits as defined above and maximize level of functional mobility. As demonstrated by objective findings, the assigned level of complexity for this evaluation is high. The patient's -[facility] Basic Mobility Inpatient Short Form Raw Score is 16. A Raw score of less than or equal to 16 suggests the patient may benefit from discharge to post-acute rehabilitation services. Please also refer to the recommendation of the Physical Therapist for safe discharge planning. Rehab Resource Intensity Level, PT: II (Moderate Resource Intensity)    See flowsheet documentation for full assessment.

## 2023-11-08 NOTE — PROGRESS NOTES
[address]  Progress Note  Name: Tono Castaneda  MRN: [id number]  Unit/Bed#: 3 [place] [address]-01 I Date of Admission: 11/6/2023   Date of Service: 11/8/2023 I Hospital Day: 1    Assessment/Plan   * Acute encephalopathy  Assessment & Plan  Patient with baseline dementia but per family is generally cooperative, mild-mannered. Lives at assisted living facility, was sent to the ER after he became combative agitated combative and agitated. Diagnosed with COVID prior to arrival on 11/4    Encephalopathy appears to be secondary to his COVID-19 infection  Continue IV remdesivir for his COVID as he is high risk, however otherwise has been stable from a respiratory standpoint  He also had a markedly elevated troponin on admission, and some new changes on echocardiogram.  He may have had a cardiac event prior to arrival contributing to his encephalopathy. We will have cardiology evaluate the patient. We will check B12, TSH, and Depakote levels    Elevated troponin  Assessment & Plan  Troponin around 950 on admission but was asymptomatic from a cardiac standpoint  He did have some a follow-up echocardiogram does show some new changes with a mild drop in EF as well as possible wall motion abnormalities. This suggest patient may have had cardiac event prior to arrival.     we will have cardiology evaluate the patient. COVID-19  Assessment & Plan  Initially diagnosed on 11/4 at assisted living facility  Patient continues to be asymptomatic from respiratory standpoint and stable on room air. Encephalopathy is improving  Continue IV remdesivir as patient is high risk given his dementia, comorbidities, and age. Contain contact and airborne precautions.     Acute kidney injury superimposed on CKD   Assessment & Plan  Prerenal etiology in the setting of COVID-19 infection and decreased p.o. intake  Renal function continues to improve and is now near baseline  Patient tolerating p.o. intake; will discontinue IV fluids  Recheck BMP in the morning    Dementia [facility])  Assessment & Plan  History of dementia but is typically mild-mannered and well behaved at the assisted living facility where he resides  Became combative and agitated prompting his visit to the ER. Also has COVID. Seen by psychiatry while inpatient; no new changes recommended, they recommended outpatient follow-up with psychiatry. Family working on transitioning patient to a longer term care facility. Will likely need rehab on discharge  Continue home Depakote and Aricept      Elevated brain natriuretic peptide (BNP) level  Assessment & Plan  . No edema on extremities. Chest x-ray appears unremarkable to me, pending final read  2D echo in April 2023 showed EF 25%, grade 1 diastolic dysfunction, moderate MR, mild to moderate TR, small pericardial effusion. Likely due to COVID 19  Telemetry  Update 2D echo    Microscopic hematuria  Assessment & Plan  UA 2 days ago only showed trace blood. Monitor for overt hematuria  Possible secondary to straight cath    Pacemaker  Assessment & Plan  Noted history. History of gout  Assessment & Plan  Continue allopurinol    Hyperlipidemia  Assessment & Plan  Continue low-dose statin    Essential hypertension  Assessment & Plan  Typically on losartan at home; BP has been stable without this  Continue to monitor        VTE Pharmacologic Prophylaxis: VTE Score: 5 High Risk (Score >/= 5) - Pharmacological DVT Prophylaxis Ordered: heparin. Sequential Compression Devices Ordered. Patient Centered Rounds: I performed bedside rounds with nursing staff today. Discussions with Specialists or Other Care Team Provider: CM. Cardiology. Psychiatry    Education and Discussions with Family / Patient: Attempted to update  (daughter) via phone. Left voicemail. Total Time Spent on Date of Encounter in care of patient: 45 mins.  This time was spent on one or more of the following: performing physical exam; counseling and coordination of care; obtaining or reviewing history; documenting in the medical record; reviewing/ordering tests, medications or procedures; communicating with other healthcare professionals and discussing with patient's family/caregivers. Current Length of Stay: 1 day(s)  Current Patient Status: Inpatient   Certification Statement: The patient will continue to require additional inpatient hospital stay due to further cardiac evaluation, disposition planning, serial lab monitoring, PT/OT evaluation, IV antivirals. Discharge Plan: Anticipate discharge in 48-72 hrs to rehab facility. Code Status: Prior    Subjective:   Patient seen and examined. Patient is more alert and communicative today. He states he had an okay evening. He states he slept well. There were no events reported overnight. Patient is afebrile. Eating breakfast when I saw him this morning    Objective:     Vitals:   Temp (24hrs), Av.3 °F (36.8 °C), Min:97.8 °F (36.6 °C), Max:98.7 °F (37.1 °C)    Temp:  [97.8 °F (36.6 °C)-98.7 °F (37.1 °C)] 97.8 °F (36.6 °C)  HR:  [69-70] 70  Resp:  [16-18] 18  BP: (134-181)/(62-74) 181/74  SpO2:  [95 %-96 %] 96 %  Body mass index is 22.31 kg/m². Input and Output Summary (last 24 hours): Intake/Output Summary (Last 24 hours) at 2023 1413  Last data filed at 2023  Gross per 24 hour   Intake --   Output 250 ml   Net -250 ml       PHYSICAL EXAM:    Vitals signs reviewed  Constitutional   Awake and cooperative. NAD. Head/Neck   Normocephalic. Atraumatic. HEENT   No scleral icterus. EOMI. Heart   Regular rate and rhythm. No murmers. Lungs   Clear to auscultation bilaterally. Respirations unlaboured. Abdomen   Soft. Nontender. Nondistended. Skin   Skin color normal. No rashes. Extremities   No deformities. No peripheral edema. Neuro   Alert and oriented. No new deficits. Psych   Mood stable.  Affect normal.         Additional Data: Labs:  Results from last 7 days   Lab Units 11/08/23  0828   WBC Thousand/uL 3.76*   HEMOGLOBIN g/dL 12.4   HEMATOCRIT % 37.6   PLATELETS Thousands/uL 107*   NEUTROS PCT % 60   LYMPHS PCT % 25   MONOS PCT % 14*   EOS PCT % 0     Results from last 7 days   Lab Units 11/08/23  0828 11/07/23  0554   SODIUM mmol/L 142 138   POTASSIUM mmol/L 4.0 4.0   CHLORIDE mmol/L 113* 108   CO2 mmol/L 22 21   BUN mg/dL 37* 38*   CREATININE mg/dL 1.54* 1.78*   ANION GAP mmol/L 7 9   CALCIUM mg/dL 7.8* 7.5*   ALBUMIN g/dL  --  3.2*   TOTAL BILIRUBIN mg/dL  --  0.35   ALK PHOS U/L  --  50   ALT U/L  --  22   AST U/L  --  42*   GLUCOSE RANDOM mg/dL 74 81                 Results from last 7 days   Lab Units 11/07/23  1414   PROCALCITONIN ng/ml 1.27*       Lines/Drains:  Invasive Devices       Peripheral Intravenous Line  Duration             Peripheral IV 11/06/23 Right Antecubital 2 days    Peripheral IV 11/07/23 Dorsal (posterior); Right Forearm <1 day                          Imaging: Reviewed radiology reports from this admission including: ECHO    Recent Cultures (last 7 days):         Last 24 Hours Medication List:   Current Facility-Administered Medications   Medication Dose Route Frequency Provider Last Rate    acetaminophen  650 mg Oral Q6H PRN SHERRY Rolon      allopurinol  100 mg Oral Daily SHERRY Rolon      azithromycin  500 mg Oral Q24H SHERRY Rolon      vitamin B-12  1,000 mcg Oral Daily SHERRY Rolon      dicyclomine  10 mg Oral BID AC SHERRY Rolon      divalproex sodium  250 mg Oral Q12H [address] SHERRY Rolon      donepezil  5 mg Oral HS SHERRY Rolon      guaiFENesin  600 mg Oral Q12H [address] SHERRY Rolon      heparin (porcine)  5,000 Units Subcutaneous Q8H [address] SHERRY Rolon      ondansetron  4 mg Intravenous Q6H PRN SHERRY Rolon      pravastatin  20 mg Oral Daily With SHERRY Perez      remdesivir  100 mg Intravenous Q24H SHERRY Rolon          Today, Patient Was Seen By: Mirian Gray DO    **Please Note: This note may have been constructed using a voice recognition system. **

## 2023-11-08 NOTE — OCCUPATIONAL THERAPY NOTE
Occupational Therapy Evaluation     Patient Name: Sunshine GREEN Date: 11/8/2023  Problem List  Principal Problem:    Acute encephalopathy  Active Problems:    Essential hypertension    Hyperlipidemia    History of gout    Pacemaker    Acute kidney injury superimposed on CKD     COVID-19    Dementia (HCC)    Elevated AST (SGOT)    Microscopic hematuria    Elevated brain natriuretic peptide (BNP) level    Elevated troponin    Past Medical History  Past Medical History:   Diagnosis Date    Alzheimer disease ([address])     CAD (coronary artery disease)     GERD (gastroesophageal reflux disease)     Gout     History of heart attack     HLD (hyperlipidemia)     Hypertension      Past Surgical History  Past Surgical History:   Procedure Laterality Date    CARDIAC ELECTROPHYSIOLOGY PROCEDURE N/A 4/14/2023    Procedure: Cardiac pacer implant;  Surgeon: Candido Farmer MD;  Location: [address] [place] [address] CATH LAB; Service: Cardiology    HIATAL HERNIA REPAIR           11/08/23 1230   OT Last Visit   OT Visit Date 11/08/23  (Wednesday)   Note Type   Note type Evaluation   Pain Assessment   Pain Assessment Tool FLACC   Pain Location/Orientation Orientation: Right;Location: Arm  ("elbow")   Effect of Pain on Daily Activities limits pace and I w/ bed mobility   Patient's Stated Pain Goal No pain   Hospital Pain Intervention(s) Repositioned; Ambulation/increased activity; Emotional support   Pain Rating: FLACC (Rest) - Face 0   Pain Rating: FLACC (Rest) - Legs 0   Pain Rating: FLACC (Rest) - Activity 0   Pain Rating: FLACC (Rest) - Cry 0   Pain Rating: FLACC (Rest) - Consolability 0   Score: FLACC (Rest) 0   Pain Rating: FLACC (Activity) - Face 0   Pain Rating: FLACC (Activity) - Legs 1   Pain Rating: FLACC (Activity) - Activity 1   Pain Rating: FLACC (Activity) - Cry 1   Pain Rating: FLACC (Activity) - Consolability 1   Score: FLACC (Activity) 4   Restrictions/Precautions   Weight Bearing Precautions Per Order No   Other Precautions Contact/isolation; Airborne/isolation;Droplet precautions;Cognitive; Chair Alarm; Bed Alarm  (Saul on room air)   Home Living   Type of Home Assisted living; Other (Comment)  [facility])   Home Equipment   (pt denies use of AD)   Additional Comments Pt lives w/ his wife at [place] ([facility])   Prior Function   Level of Carter Needs assistance with IADLS   Lives With Spouse; Facility staff   Receives Help From Family; Other (Comment)  (staff assist w/ IADLs)   IADLs Family/Friend/Other provides transportation; Family/Friend/Other provides meals; Family/Friend/Other provides medication management   Vocational Retired   Comments Pt reports I w/ ADLs w/ out use of AD   Lifestyle   Autonomy Pt is not an accurate, consistent historian upon eval. Pt able to provide limited social history. Pt needs assistance w/ IADLs   Reciprocal Relationships Supportive wife, staff at [facility] and family   Service to Others Pt reports retired    Intrinsic Gratification Pt unable to report. Pt stated that he will figure it out when he gets home   General   Additional Pertinent History Significant PMH impacting his occupational performance includes dementia, CAD, Gout, pacemaker, lumbar spinal stenosis w/ radiculopathy. Personal and environmental factors impacting his occupational performance includes inability to complete IADLs, difficulty completing ADLs, limited insight into deficits, increased pain; supports / strengths include inability to complete IADLs, independent w/ functional mobility at baseline, supportive family/ staff at [facility]   Family/Caregiver Present No   Additional General Comments Pt admitted from [facility]   Subjective   Subjective "My arm hurts"   ADL   Where Assessed Edge of bed  (vs OOB In chair)   Eating Assistance 6  Modified independent   Eating Deficit Setup   Grooming Assistance 5  Supervision/Setup  (due to impaired cognition)   Grooming Deficit Setup;Supervision/safety;Verbal cueing; Increased time to complete   UB Bathing Assistance Unable to assess  (decreased activity tolerance)   LB Bathing Assistance Unable to assess   UB Dressing Assistance 5  Supervision/Setup   UB Dressing Deficit Setup;Supervision/safety; Increased time to complete;Verbal cueing   LB Dressing Assistance 4  Minimal Assistance   LB Dressing Deficit Setup; Thread RLE into pants; Thread LLE into pants  (+ time but able to manage )   Toileting Assistance  Unable to assess  (denied need to void)   Additional Comments on room air O2 sats >90% throughout   Bed Mobility   Supine to Sit 4  Minimal assistance   Additional items Assist x 1; Increased time required;Verbal cues; Bedrails  (to pt's R)   Sit to Supine Unable to assess   Additional Comments Pt seated OOB In chair post eval w/ needs met, call bell in reach and chair alarm activated   Transfers   Sit to Stand 4  Minimal assistance   Additional items Assist x 1; Increased time required;Verbal cues;Armrests; Bedrails   Stand to Sit 4  Minimal assistance   Additional items Assist x 1; Increased time required;Verbal cues; Bedrails;Armrests   Additional Comments Pt performed sit <> stand 2X w/ min A   Functional Mobility   Functional Mobility 4  Minimal assistance  (min A (steadying))   Additional Comments w/ in room w/ min A. Pt declined use of RW; reaching for support (furniture, walls) at times.    Additional items Hand hold assistance   Balance   Static Sitting Fair   Static Standing Fair -   Ambulatory Poor +   Activity Tolerance   Activity Tolerance Patient limited by fatigue;Patient limited by pain   Medical Staff Made Aware care coordination w/ PT, Tory due to limited insight into deficits, impaired cognition, regression from baseline, decresaed activity tolerance   Nurse Made Aware spoke w/ RNEugene and Parvin DARNELL Assessment   RUE Assessment X  (able to participate in ADLs)   RUE Strength   RUE Overall Strength Deficits  (able to participate in ADLs)   LUE Assessment   LUE Assessment [facility] LUE Strength   LUE Overall Strength Within Functional Limits - able to perform ADL tasks with strength   Hand Function   Gross Motor Coordination Functional   Fine Motor Coordination Impaired   Hand Function Comments Able to manage  w/ + time   Sensation   Light Touch No apparent deficits   Cognition   Overall Cognitive Status (S)  Impaired   Arousal/Participation Responsive   Attention Attends with cues to redirect   Orientation Level Oriented to person;Oriented to place   Memory Decreased short term memory;Decreased recall of recent events   Following Commands Follows one step commands with increased time or repetition   Comments Identified pt by full name and birthdate. Responsive and oriented to person, place. Able to follow directions w/ + time, cues. Irritable w/ flat affect. Pt is not an accurate historian at baseline due to dementia   Assessment   Limitation Decreased ADL status; Decreased cognition;Decreased endurance;Decreased self-care trans;Decreased high-level ADLs; Decreased UE strength;Decreased UE ROM  (impaired activity tolerance, pain, standing balance, generalized weakness, insight into deficits, problem solving, emotional responses)   Assessment Pt is an 81yo male admitted to [facility] on 11/6/23 from [facility] w/ aggressive and combative behavior; diagnosed w/ COVID 2 days prior to admission. Pt resides at [facility] w/ his wife. Pt needs assistance w/ IADLs at baseline and does not use AD for functional mobility. Upon eval, pt responsive and pleasantly confused. Pt required S to complete grooming, S UBD, min A LBD, min A sit <> stand, min A w/ out use of AD for functional mobility. Pt is completing ADLs below baseline level of I w/ decreased activity tolerance, decreased endurance, decreased balance, limited insight into deficits. Pt would benefit from OT in acute care to maximize functional independence. Recommend level III rehab resource intensity when medically stable.  Will continue to follow   Goals   Patient Goals Pt unable to state and added that he would figure out when he got there   Plan   Treatment Interventions ADL retraining;Functional transfer training;UE strengthening/ROM; Endurance training;Patient/family training;Equipment evaluation/education; Compensatory technique education;Continued evaluation; Energy conservation; Activityengagement   Goal Expiration Date 11/18/23   OT Treatment Day 0  (Wednesday 11/8/23)   OT Frequency 2-3x/wk   Discharge Recommendation   Rehab Resource Intensity Level, OT III (Minimum Resource Intensity)   Equipment Recommended Shower/Tub chair with back ($)   Additional Comments  may benefit from use of AD for functional mobility   AM-PAC Daily Activity Inpatient   Lower Body Dressing 3   Bathing 3   Toileting 3   Upper Body Dressing 3   Grooming 3   Eating 4   Daily Activity Raw Score 19   Daily Activity Standardized Score (Calc for Raw Score >=11) 40.22   -PAC Applied Cognition Inpatient   Following a Speech/Presentation 2   Understanding Ordinary Conversation 3   Taking Medications 2   Remembering Where Things Are Placed or Put Away 2   Remembering List of 4-5 Errands 1   Taking Care of Complicated Tasks 1   Applied Cognition Raw Score 11   Applied Cognition Standardized Score 27.03   Barthel Index   Feeding 5   Bathing 0   Grooming Score 0   Dressing Score 5   Bladder Score 10   Bowels Score 10   Toilet Use Score 5   Transfers (Bed/Chair) Score 10   Mobility (Level Surface) Score 0   Stairs Score 0   Barthel Index Score 45   Modified [place] Scale   Modified [place] Scale 4   End of Consult   Patient Position at End of Consult Bedside chair;Bed/Chair alarm activated; All needs within reach   Nurse Communication Nurse aware of consult   Licensure   [address] [place] [address] Number  Imani Gonzales, OTR/L [phone]       The patient's raw score on the AM-PAC Daily Activity Inpatient Short Form is 19.  A raw score of greater than or equal to 19 suggests the patient may benefit from discharge to home. Please refer to the recommendation of the Occupational Therapist for safe discharge planning.     Pt goals to be met by 11/18/23 to maximize independence w/ ADLs to return to PLOF w/ wife includes:    -Pt will complete bed mobility supine <> sit w/ mod I in preparation for ADLs    -Pt will complete functional transfers to bed, chair,and toilet using LRAD w/ S to maximize I w/ ADLs    -Pt will consistently engage in functional mobility using LRAD to / from bathroom w/ S    -Pt will complete LBD w/ S after set- up    -Pt will demonstrate improved activity and sitting tolerance OOB in chair for all meals    -Pt will consistently follow 2 step directions during ADLs w/ no more than 1 cue / prompt to max I w/ ADLs and improve engagement    -Pt will demonstrate good attention and participation in ongoing eval of functional cognition as appropriate to assist in DC planning    -Pt will demonstrate improved functional standing tolerance for at least 10 minutes w/ at least fair balance to participate in grooming/ bathing w/ S using LRAD as needed    THOM Garrido/JONI  [id number]  [id number]

## 2023-11-08 NOTE — NURSING NOTE
Pt making numerous attempts to get OOB and leave his room,  Bed alarm going off and reset when placed pt back in bed. Pt becoming more upset, frustrated and swinging at staff and yelling at staff "I need to go home". Pt was re-oriented and placed back into bed. Siderails up, bed alarm on. MD aware, Will monitor closely.

## 2023-11-08 NOTE — CONSULTS
TeleConsultation - Isabela 80 y.o. male MRN: [id number]  Unit/Bed#: [address] [place] [address]- Encounter: [id number]        REQUIRED DOCUMENTATION:     1. This service was provided via Telemedicine. 2. Provider located at [place]. 3. TeleMed provider: Rosa Ramirez MD.  4. Identify all parties in room with patient during tele consult:  Nurse  5. Patient was then informed that this was a Telemedicine visit and that the exam was being conducted confidentially over secure lines. My office door was closed. No one else was in the room. Patient acknowledged consent and understanding of privacy and security of the Telemedicine visit, and gave us permission to have the assistant stay in the room in order to assist with the history and to conduct the exam.  I informed the patient that I have reviewed their record in Epic and presented the opportunity for them to ask any questions regarding the visit today. The patient agreed to participate. Assessment/Plan     Principal Problem:    Acute encephalopathy  Active Problems:    Essential hypertension    Hyperlipidemia    History of gout    Pacemaker    Acute kidney injury superimposed on CKD     COVID-19    Dementia (HCC)    Elevated AST (SGOT)    Microscopic hematuria    Elevated brain natriuretic peptide (BNP) level    Elevated troponin    Assessment:    Cognitive Disorder with behavioral disturbance    Treatment Plan:    Patient is psychiatrically cleared to be discharged to assisted living facility. Planned Medication Changes:    Avoid any antipsychotic medication due to prolonged Qtc    Continue with his current psychotropic medications (Depakote and Aricept). Check TSH , B12 level and Depakote level.        Current Medications:     Current Facility-Administered Medications   Medication Dose Route Frequency Provider Last Rate    acetaminophen  650 mg Oral Q6H PRN SHERRY Rolon      allopurinol  100 mg Oral Daily SHERRY Hurley azithromycin  500 mg Oral Q24H Cuigeniaenedina Yanrik, CRNP      vitamin B-12  1,000 mcg Oral Daily Cuivijaya Yanrik, CRNP      dicyclomine  10 mg Oral BID AC Cuivijaya Yanrik, CRNP      divalproex sodium  250 mg Oral Q12H [facility] Cuivijaya Yanrik, CRNP      donepezil  5 mg Oral HS Cuivijaya Yanrik, CRNP      guaiFENesin  600 mg Oral Q12H [facility], CRNP      heparin (porcine)  5,000 Units Subcutaneous Q8H [facility] Cuvijaya Yanrik, CRNP      ondansetron  4 mg Intravenous Q6H PRN Yuly Dotsonchristi, CRNP      pravastatin  20 mg Oral Daily With Martin & Tera, CRNP      remdesivir  100 mg Intravenous Q24H Cuvijaya Dotsonchristi, CRNP         Risks / Benefits of Treatment:    Patient does not verbalize understanding at this time and will require further explanation. Other treatment modalities ordered as indicated:    Psychotherapy      Inpatient consult to Psychiatry  Consult performed by: Raegan Kevin MD  Consult ordered by: Shira Mclaughlin DO        Physician Requesting Consult: Jomar Gray, *  Principal Problem:Acute encephalopathy    Reason for Consult:  Dementia      History of Present Illness      Patient is a 80 y.o. male with a history of Cognitive Disorder who was admitted to the medical service on 11/6/2023 due to acute encephalopathy. Patient is a poor historian due to his dementia. He denied any depressive symptoms. No suicidal or homicidal ideation was reported. As per nurse he does not have any aggression or agitation since he is admitted here. He has been taking his medications and eating and sleeping fine. He is medically clear to be discharged. No psychotic symptoms was reported.         Psychiatric Review Of Systems:     Sleep changes: no  appetite changes: no  weight changes: no  anxiety/panic: no  yfn: no  guilty/hopeless: no  self injurious behavior/risky behavior: no    Historical Information     Past Psychiatric History:     Psychiatric Hospitalizations: No history of past inpatient psychiatric admissions    Substance Abuse History:    E-Cigarette/Vaping    E-Cigarette Use Never User           Social History       Tobacco History       Smoking Status  Former Quit Date   Smoking Frequency  1.00 packs/day Smoking Tobacco Type  Cigarettes quit in       Smokeless Tobacco Use  Never              Alcohol History       Alcohol Use Status  Not Currently              Drug Use       Drug Use Status  Never              Sexual Activity       Sexually Active  Not Currently              Activities of Daily Living    Not Asked                       Social History:    Social History       Social History     Socioeconomic History    Marital status: Unknown     Spouse name: Not on file    Number of children: Not on file    Years of education: Not on file    Highest education level: Not on file   Occupational History    Not on file   Tobacco Use    Smoking status: Former     Packs/day: 1.00     Types: Cigarettes     Quit date:      Years since quittin.8    Smokeless tobacco: Never   Vaping Use    Vaping Use: Never used   Substance and Sexual Activity    Alcohol use: Not Currently    Drug use: Never    Sexual activity: Not Currently   Other Topics Concern    Not on file   Social History Narrative    Not on file     Social Determinants of Health     Financial Resource Strain: Not on file   Food Insecurity: No Food Insecurity (2023)    Hunger Vital Sign     Worried About Running Out of Food in the Last Year: Never true     Ran Out of Food in the Last Year: Never true   Transportation Needs: No Transportation Needs (2023)    PRAPARE - Transportation     Lack of Transportation (Medical): No     Lack of Transportation (Non-Medical):  No   Physical Activity: Not on file   Stress: Not on file   Social Connections: Not on file   Intimate Partner Violence: Not on file   Housing Stability: Unknown (2023)    Housing Stability Vital Sign     Unable to Pay for Housing in the Last Year: No     Number of Places Lived in the Last Year: Not on file     Unstable Housing in the Last Year: No             Past Medical History:    Past Medical History:   Diagnosis Date    Alzheimer disease ([address])     CAD (coronary artery disease)     GERD (gastroesophageal reflux disease)     Gout     History of heart attack     HLD (hyperlipidemia)     Hypertension           Meds/Allergies     No Known Allergies  all current active meds have been reviewed    Medical Review Of Systems:    Review of Systems      Objective     Vital signs in last 24 hours:  Temp:  [97.8 °F (36.6 °C)-98.8 °F (37.1 °C)] 97.8 °F (36.6 °C)  HR:  [69-74] 70  Resp:  [16-18] 18  BP: (129-181)/(62-74) 181/74      Intake/Output Summary (Last 24 hours) at 11/8/2023 1121  Last data filed at 11/7/2023 2001  Gross per 24 hour   Intake 240 ml   Output 650 ml   Net -410 ml       Mental Status Evaluation[de-identified]    Appearance disheveled   Behavior cooperative, calm   Speech normal rate, normal volume, normal pitch   Mood normal   Affect normal range and intensity, appropriate   Thought Processes organized, goal directed   Associations intact associations   Thought Content no overt delusions   Perceptual Disturbances: no auditory hallucinations, no visual hallucinations   Abnormal Thoughts  Risk Potential Suicidal ideation - None  Homicidal ideation - None  Potential for aggression - No   Orientation oriented to person and place   Memory recent memory impaired, remote memory impaired   Consciousness alert and awake   Attention Span Concentration Span attention span and concentration are age appropriate   Intellect appears to be of average intelligence   Insight impaired   Judgement impaired   Muscle Strength and  Gait No assessed   Motor Activity no abnormal movements   Language no difficulty naming common objects, no difficulty repeating a phrase, no difficulty writing a sentence                   Lab Results: I have personally reviewed all pertinent laboratory/tests results. Most Recent Labs:   Lab Results   Component Value Date    WBC 3.76 (L) 11/08/2023    RBC 3.87 (L) 11/08/2023    HGB 12.4 11/08/2023    HCT 37.6 11/08/2023     (L) 11/08/2023    RDW 13.7 11/08/2023    NEUTROABS 2.23 11/08/2023    SODIUM 142 11/08/2023    K 4.0 11/08/2023     (H) 11/08/2023    CO2 22 11/08/2023    BUN 37 (H) 11/08/2023    CREATININE 1.54 (H) 11/08/2023    CALCIUM 7.8 (L) 11/08/2023    AST 42 (H) 11/07/2023    ALT 22 11/07/2023    ALKPHOS 50 11/07/2023    TP 5.6 (L) 11/07/2023    TBILI 0.35 11/07/2023       Imaging Studies: Echo follow up/limited w/ contrast if indicated    Result Date: 11/7/2023  Narrative:   Left Ventricle: Left ventricular cavity size is normal. Wall thickness is normal. The left ventricular ejection fraction is 50%. Systolic function is mildly reduced. There is akinesis of inferior basilar segments noted with normal contractility of other segments with paradoxical septal motion which makes it difficult to interpret wall motion abnormality   IVS: There is abnormal septal motion consistent with right ventricular pacing. Left Atrium: The atrium is mildly dilated. Aortic Valve: The aortic valve is trileaflet. The leaflets are moderately thickened. The leaflets are moderately calcified. The leaflets exhibit normal mobility. There is mild regurgitation. There is mild to moderate stenosis. Calculated valve area 1.45 cm2 with mean gradient around 14 mmHg. Mitral Valve: There is moderate annular calcification. There is mild regurgitation. Tricuspid Valve: There is mild regurgitation. Pulmonary artery pressure around 35 mmHg. Pulmonic Valve: There is mild regurgitation. This is a limited study due to lack of patient's cooperation and his body habitus. XR chest 1 view portable    Result Date: 11/7/2023  Narrative: CHEST INDICATION:   agitation.  COMPARISON: 4/15/2023 EXAM PERFORMED/VIEWS:  XR CHEST PORTABLE FINDINGS: Cardiomediastinal silhouette appears unremarkable. The lungs are clear. Slight left base atelectasis. Left pacer. Left clavicle old trauma. No pneumothorax or pleural effusion. Osseous structures appear within normal limits for patient age. Impression: No acute cardiopulmonary disease. Workstation performed: [id number]     CT head wo contrast    Result Date: 11/7/2023  Narrative: CT BRAIN - WITHOUT CONTRAST INDICATION:   Mental status change, persistent or worsening Confusion agitation. COMPARISON:  None. TECHNIQUE:  CT examination of the brain was performed. Multiplanar 2D reformatted images were created from the source data. Radiation dose length product (DLP) for this visit:  858 mGy-cm . This examination, like all CT scans performed in the [facility], was performed utilizing techniques to minimize radiation dose exposure, including the use of iterative reconstruction and automated exposure control. IMAGE QUALITY:  Diagnostic. FINDINGS: PARENCHYMA: Decreased attenuation is noted in periventricular and subcortical white matter demonstrating an appearance that is statistically most likely to represent mild microangiopathic change. No CT signs of acute infarction. No intracranial mass, mass effect or midline shift. No acute parenchymal hemorrhage. Small calcifications in bilateral cerebellum. Partial empty sella. Age-appropriate cerebral volume loss. VENTRICLES AND EXTRA-AXIAL SPACES:  Normal for the patient's age. VISUALIZED ORBITS: Sequela of bilateral cataract surgery. PARANASAL SINUSES: Mild mucosal thickening of the visualized paranasal sinuses, worse in ethmoid sinuses. CALVARIUM AND EXTRACRANIAL SOFT TISSUES: No acute calvarial fracture. No scalp abnormality. Small metallic object in left external auditory canal, indeterminate. Impression: No acute intracranial abnormality. Chronic microangiopathic changes. Small metallic object in left external auditory canal, indeterminate if this is a hearing aid.  Recommend direct visualization for further evaluation. The study was marked in [facility] for immediate notification. Workstation performed: [id number]     Cardiac EP device report    Result Date: 10/19/2023  Narrative: MDT SC/PM-ACTIVE SYSTEM IS MRI CONDITIONAL CARELINK TRANSMISSION: BATTERY VOLTAGE ADEQUATE. (13 YRS)  99%. ALL AVAILABLE LEAD PARAMETERS WITHIN NORMAL LIMITS. 1 VHR EPISODE DETECTED 14 BEATS @ 330ms. PATIENT IS NOT ON BBLOCKER; EF 60% (2023). NORMAL DEVICE FUNCTION. ---CHAVIRA     Cardiac EP device report    Result Date: 10/19/2023  Narrative: MDT SC/PM-ACTIVE SYSTEM IS MRI CONDITIONAL CARELINK TRANSMISSION: BATTERY VOLTAGE ADEQUATE. (13 YRS)  99%. ALL AVAILABLE LEAD PARAMETERS WITHIN NORMAL LIMITS. 1 VHR EPISODE DETECTED 14 BEATS @ 330ms. PATIENT IS NOT ON BBLOCKER; EF 60% (2023). NORMAL DEVICE FUNCTION. ---CHAVIRA     EKG/Pathology/Other Studies:   Lab Results   Component Value Date    VENTRATE 70 11/07/2023    ATRIALRATE 85 11/07/2023    PRINT 276 04/02/2023    QRSDINT 142 11/07/2023    QTINT 498 11/07/2023    QTCINT 537 11/07/2023    PAXIS 18 11/07/2023    QRSAXIS -54 11/07/2023    TWAVEAXIS -51 11/07/2023        Code Status: Prior  Advance Directive and Living Will:      Power of : Yes  POLST:      Screenings:    1. Nutrition Screening  Nutrition Assessment (completed by Staff):      2. Pain Screening  Pain Screening: Pain Assessment  Pain Assessment Tool: 0-10  Pain Score: 0  Patient's Stated Pain Goal: No pain  Hospital Pain Intervention(s): Repositioned, Rest  Multiple Pain Sites: No    3. Suicide Screening                                                         [place]-SUICIDE SEVERITY RATING SCALE (C-SSRS)                            1. In the last month have you wished you were dead or wished you could go to sleep and not wake up? No  2. In the last month, have you actually had thoughts about killing yourself? No  6.  Have you done anything, started to do anything, or prepared to do anything to end your life in the last 3 months? No  Suicide Risk Level : Low     Counseling / Coordination of Care: Total floor / unit time spent today 30 minutes. Greater than 50% of total time was spent with the patient and / or family counseling and / or coordination of care.  A description of the counseling / coordination of care: Direct Patient Care, Chart Review, and Documentation

## 2023-11-08 NOTE — CONSULTS
Consultation - Cardiology   [facility]     Alecia Ferris 80 y.o. male MRN: [id number]  : 1935  Unit/Bed#: [address] [place] [address] Encounter: [id number]      Assessment & Plan   Elevated troponin.    - 23 hs troponin random: 948.  - 23 hs troponin random: 986.   - 23 hs troponin random: 550.   - 23 EKG: Sinus rhythm with complete heart block and Ventricular-paced rhythm, rate 70 bpm.   - Likely non-ischemic myocardial injury secondary to COVID-19 infection. - 23 TTE: LVEF 50%. Systolic function is mildly reduced. There is akinesis of inferior basilar segments noted with normal contractility of other segments with paradoxical septal motion which makes it difficult to interpret wall motion abnormality. Unable to assess diastolic function. Mild aortic valve regurgitation. Mild to moderate aortic valve stenosis. , Calculated valve area 1.45 cm2 with mean gradient around 14 mmHg. Mild mitral valve regurgitation. Mild tricuspid valve regurgitation. Pulmonary artery pressure around 35 mmHg. Mild pulmonic valve regurgitation.  - Wall motion abnormalities likely secondary to PPM implantation in 2023 and COVID-19 diagnosis. - 23 TTE: LVEF 60%. Systolic function is normal.  Wall motion is normal. Diastolic function is mildly abnormal, consistent with grade I (abnormal) relaxation. Mild aortic valve regurgitation. Moderate mitral regurgitation. Mild mitral valve stenosis, mean gradient around 5 mmHg. Mild to moderate tricuspid valve regurgitation. Pulmonary artery pressure 40 mmHg. Mild pulmonic valve regurgitation.  - Home medication of losartan 25 mg restarted on . - Will start coreg 3.25 mg twice daily. Acute encephalopathy.    - Patient with known history of dementia but is typically cooperative. Presented on  for evaluation for agitation.  - Encephalopathy thought secondary to COVID-19 infection.   - 23 CT head w/o contrast: No acute intracranial abnormality. Chronic microangiopathic changes. - Care per primary team.     COVID-19.    - Initially diagnosed with COVID-19 at assisted living facility on 11/04.  - 11/07/23 CXR: No acute cardiopulmonary disease.   - 11/06/23 CRP: 91.  - 11/06/23 CK: 404.   - 11/07/23 d-dimer: 1.58.   - Currently receiving remdesivir.  - Care per primary team.     Chronic diastolic heart failure. - Does not appear in acute phase. Patient is euvolemic on examination. - 11/07/23 CXR: No acute cardiopulmonary disease.   - 11/06/23 BNP: 416. Likely elevated in setting of COVID-19 and valvular heart disease.  - 11/07/23 TTE: LVEF 50%. Systolic function is mildly reduced. There is akinesis of inferior basilar segments noted with normal contractility of other segments with paradoxical septal motion which makes it difficult to interpret wall motion abnormality. Unable to assess diastolic function. Mild aortic valve regurgitation. Mild to moderate aortic valve stenosis. , Calculated valve area 1.45 cm2 with mean gradient around 14 mmHg. Mild mitral valve regurgitation. Mild tricuspid valve regurgitation. Pulmonary artery pressure around 35 mmHg. Mild pulmonic valve regurgitation. - 4/14/23 TTE: LVEF 60%. Systolic function is normal.  Wall motion is normal. Diastolic function is mildly abnormal, consistent with grade I (abnormal) relaxation. Mild aortic valve regurgitation. Moderate mitral regurgitation. Mild mitral valve stenosis, mean gradient around 5 mmHg. Mild to moderate tricuspid valve regurgitation. Pulmonary artery pressure 40 mmHg. Mild pulmonic valve regurgitation.  - Home medication of losartan 25 mg restarted on 11/08. - Review home medications notes that patient is not on beta-blocker therapy or diuretics. - Will start coreg 3.25 mg twice daily.     Second-degree heart block s/p PPM implantation.    - s/p single-chamber permanent pacemaker implantation (Medtronic) on 4/14/23.   - 11/07/23 EKG: Sinus rhythm with complete heart block and Ventricular-paced rhythm, rate 70 bpm.    - 10/19/23 cardiac EP device report: Battery voltage adequate, 13 years.  99%. All available lead parameters within normal limits. 1 VHR episode detected 14 beats at 330 ms. Normal device function. Valvular heart disease.    - Mild aortic valve regurgitation. Mild to moderate aortic valve stenosis. Mild mitral valve regurgitation. Mild tricuspid valve regurgitation. Mild pulmonic valve regurgitation. - 11/07/23 TTE:     Aortic Valve The aortic valve is trileaflet. The leaflets are moderately thickened. The leaflets are moderately calcified. The leaflets exhibit normal mobility. There is mild regurgitation. There is mild to moderate stenosis. Calculated valve area 1.45 cm2 with mean gradient around 14 mmHg. Mitral Valve There is moderate annular calcification. There is mild regurgitation. There is no evidence of stenosis. Tricuspid Valve There is mild regurgitation. Pulmonary artery pressure around 35 mmHg. There is no evidence of stenosis. Pulmonic Valve There is mild regurgitation. There is no evidence of stenosis. - 4/14/23 TTE:     Aortic Valve The aortic valve is trileaflet. The leaflets are moderately thickened. The leaflets are moderately calcified. There is moderately reduced mobility. There is mild regurgitation. The aortic valve has no significant stenosis. 2D there appears to be moderate aortic stenosis, mean gradient 25 and peak gradient 50 mmHg. By continuity patient's aortic valve area is calculated to 1.2 cm 2. Mitral Valve There is severe annular calcification. There is moderate regurgitation. There is mild stenosis. Mean gradient around 5 mmHg. Tricuspid Valve There is mild to moderate regurgitation. PAP 40 mm of Hg. There is no evidence of stenosis. Pulmonic Valve There is mild regurgitation. There is no evidence of stenosis.        Coronary artery disease.    - Prior notes from 9/10/21 [facility] cardiology notes patient has a remote history of inferior infarct in 1995 and coronary intervention at that time. - Will start coreg 3.25 mg twice daily. Hypertension.    - SBP since admission 129- 183. - Home medication of losartan 25 mg restarted on 11/08. - Will start coreg 3.25 mg twice daily. - Continue to monitor BP. Dyslipidemia. - Currently on pravastatin 20 mg daily. - 6/11/21 lipid panel: Cholesterol 146, triglycerides 74, HDL 69, LDL 69. ANN MARIE on CKD stage III. - Baseline creatinine appears around 1.4.  - 11/07/23 creatinine (on admission): 2.02.   - 11/08/23 creatinine: 1.78.   - Continue to trend creatinine. 11. Dementia. - Psychiatry consulted. - Care per primary team.     Summary of Recommendations: Thank you for your consultation. Physician Requesting Consult: Mariela Red, *    Reason for Consult / Principal Problem: Elevated troponin, new wall motion abnormalities on TTE. Inpatient consult to Cardiology  Consult performed by: Melita Polanco PA-C  Consult ordered by: Mariela Red DO      HPI: Eduardo Spatz is a 80y.o. year old male with PMHx of second-degree heart block s/p single-chamber PPM implantation (Medtronic, 4/04/23), chronic diastolic heart failure, valvular heart disease, CAD s/p MI (1995), HTN, dyslipidemia, CKD stage III, dementia who presented on 11/06/23 for evaluation for worsening agitation and aggression at assisted living facility. Patient is a poor historian due to baseline dementia. On review of chart patient was diagnosed with COVID-19 on 11/04 at the assisted living facility. Presented on 11/07/23 for evaluation for acute encephalopathy. Cardiology consulted for elevated troponin and new wall motion abnormalities noted on 11/07/23 TTE.      Review of Systems   Unable to perform ROS: Dementia       Historical Information   Past Medical History:   Diagnosis Date    Alzheimer disease ([address] [facility])     CAD (coronary artery disease)     GERD (gastroesophageal reflux disease)     Gout     History of heart attack     HLD (hyperlipidemia)     Hypertension      Past Surgical History:   Procedure Laterality Date    CARDIAC ELECTROPHYSIOLOGY PROCEDURE N/A 2023    Procedure: Cardiac pacer implant;  Surgeon: Susana Killian MD;  Location: [address] [place] [address] CATH LAB; Service: Cardiology    HIATAL HERNIA REPAIR       Social History     Substance and Sexual Activity   Alcohol Use Not Currently     Social History     Substance and Sexual Activity   Drug Use Never     Social History     Tobacco Use   Smoking Status Former    Packs/day: 1.00    Types: Cigarettes    Quit date:     Years since quittin.8   Smokeless Tobacco Never     Family History: History reviewed. No pertinent family history.     Meds/Allergies    PTA meds:    Medications Prior to Admission   Medication    colchicine (COLCRYS) 0.6 mg tablet    dicyclomine (BENTYL) 10 mg capsule    divalproex sodium (DEPAKOTE) 125 mg EC tablet    donepezil (ARICEPT) 5 mg tablet    losartan (COZAAR) 25 mg tablet    simvastatin (ZOCOR) 10 mg tablet    vitamin B-12 (VITAMIN B-12) 1,000 mcg tablet    allopurinol (ZYLOPRIM) 100 mg tablet      No Known Allergies    Current Facility-Administered Medications:     acetaminophen (TYLENOL) tablet 650 mg, 650 mg, Oral, Q6H PRN, Cuiyin Yurik, CRNP    allopurinol (ZYLOPRIM) tablet 100 mg, 100 mg, Oral, Daily, Cuiyin Yurik, CRNP, 100 mg at 23    azithromycin (ZITHROMAX) tablet 500 mg, 500 mg, Oral, Q24H, Cuiyin Yurik, CRNP, 500 mg at 23 0334    cyanocobalamin (VITAMIN B-12) tablet 1,000 mcg, 1,000 mcg, Oral, Daily, Cuiyin Yurik, CRNP, 1,000 mcg at 23    dicyclomine (BENTYL) capsule 10 mg, 10 mg, Oral, BID AC, Cuiyin Yurik, CRNP, 10 mg at 23    divalproex sodium (DEPAKOTE) DR tablet 250 mg, 250 mg, Oral, Q12H [address], Cuiyin Yurik, CRNP, 250 mg at 23    donepezil (ARICEPT) tablet 5 mg, 5 mg, Oral, HS, Yuly Yanrik, CRNP, 5 mg at 11/07/23 2136    guaiFENesin (MUCINEX) 12 hr tablet 600 mg, 600 mg, Oral, Q12H [facility], Yuly Yanrik, CRNP, 600 mg at 11/08/23 1021    heparin (porcine) subcutaneous injection 5,000 Units, 5,000 Units, Subcutaneous, Q8H [facility], Yuly Farrahrik, CRNP, 5,000 Units at 11/07/23 2136    ondansetron (ZOFRAN) injection 4 mg, 4 mg, Intravenous, Q6H PRN, Fabiánenedina Acevedo, CRNP    pravastatin (PRAVACHOL) tablet 20 mg, 20 mg, Oral, Daily With Dinner, Yuly Farrahrik, CRNP, 20 mg at 11/07/23 1723    [COMPLETED] remdesivir (Veklury) 200 mg in sodium chloride 0.9 % 290 mL IVPB, 200 mg, Intravenous, Q24H, Stopped at 11/07/23 0600 **FOLLOWED BY** remdesivir (Veklury) 100 mg in sodium chloride 0.9 % 270 mL IVPB, 100 mg, Intravenous, Q24H, Yuly Farrahrik, CRNP, 100 mg at 11/08/23 0352    VTE Pharmacologic Prophylaxis:   Heparin    Objective:   Vitals: Blood pressure (!) 181/74, pulse 70, temperature 97.8 °F (36.6 °C), temperature source Tympanic, resp. rate 18, height 5' 4" (1.626 m), weight 59 kg (130 lb), SpO2 96 %. Body mass index is 22.31 kg/m². Wt Readings from Last 3 Encounters:   11/07/23 59 kg (130 lb)   04/25/23 55.8 kg (123 lb)   04/14/23 56.7 kg (125 lb)     BP Readings from Last 3 Encounters:   11/08/23 (!) 181/74   11/05/23 138/75   04/25/23 142/80     Orthostatic Blood Pressures      Flowsheet Row Most Recent Value   Blood Pressure 181/74 filed at 11/08/2023 6373   Patient Position - Orthostatic VS Lying filed at 11/08/2023 0726            Intake/Output Summary (Last 24 hours) at 11/8/2023 1407  Last data filed at 11/7/2023 2001  Gross per 24 hour   Intake --   Output 250 ml   Net -250 ml       Invasive Devices       Peripheral Intravenous Line  Duration             Peripheral IV 11/06/23 Right Antecubital 2 days    Peripheral IV 11/07/23 Dorsal (posterior); Right Forearm <1 day                      Physical Exam:   Physical Exam  Vitals reviewed.    Constitutional:       General: He is not in acute distress. Cardiovascular:      Rate and Rhythm: Normal rate and regular rhythm. Pulses: Normal pulses. Heart sounds: Murmur heard. Pulmonary:      Effort: Pulmonary effort is normal. No respiratory distress. Abdominal:      General: Abdomen is flat. There is no distension. Palpations: Abdomen is soft. Tenderness: There is no abdominal tenderness. Musculoskeletal:      Right lower leg: No edema. Left lower leg: No edema. Skin:     General: Skin is warm and dry. Neurological:      Mental Status: He is alert.      Labs:   Troponins:  Results from last 7 days   Lab Units 11/07/23  1414 11/07/23  0554 11/07/23  0322 11/06/23  2327   CK TOTAL U/L  --  352*  --  404*   HS TNI RAND ng/L 550* 986* 948*  --        CBC with diff:   Results from last 7 days   Lab Units 11/08/23  0828 11/07/23  0554 11/06/23  2327   WBC Thousand/uL 3.76* 4.75 6.80   HEMOGLOBIN g/dL 12.4 12.4 13.7   HEMATOCRIT % 37.6 37.0 41.5   MCV fL 97 97 97   PLATELETS Thousands/uL 107* 109* 130*   RBC Million/uL 3.87* 3.83* 4.30   MCH pg 32.0 32.4 31.9   MCHC g/dL 33.0 33.5 33.0   RDW % 13.7 13.6 13.7   MPV fL 12.1 12.3 12.1   NRBC AUTO /100 WBCs 0 0 0       CMP:   Results from last 7 days   Lab Units 11/08/23  0828 11/07/23  0554 11/06/23  2327   SODIUM mmol/L 142 138 136   POTASSIUM mmol/L 4.0 4.0 4.3   CHLORIDE mmol/L 113* 108 105   CO2 mmol/L 22 21 20*   ANION GAP mmol/L 7 9 11   BUN mg/dL 37* 38* 40*   CREATININE mg/dL 1.54* 1.78* 2.02*   CALCIUM mg/dL 7.8* 7.5* 8.1*   AST U/L  --  42* 47*   ALT U/L  --  22 24   ALK PHOS U/L  --  50 55   TOTAL PROTEIN g/dL  --  5.6* 6.5   ALBUMIN g/dL  --  3.2* 3.7   TOTAL BILIRUBIN mg/dL  --  0.35 0.48   EGFR ml/min/1.73sq m 39 33 28   GLUCOSE RANDOM mg/dL 74 81 90       Magnesium:   Results from last 7 days   Lab Units 11/08/23  0534 11/06/23  2327   MAGNESIUM mg/dL 2.0 1.9     Imaging & Testing     Cardiac testing:   Echo follow up/limited w/ contrast if indicated    Result Date: 11/7/2023  Narrative:   Left Ventricle: Left ventricular cavity size is normal. Wall thickness is normal. The left ventricular ejection fraction is 50%. Systolic function is mildly reduced. There is akinesis of inferior basilar segments noted with normal contractility of other segments with paradoxical septal motion which makes it difficult to interpret wall motion abnormality   IVS: There is abnormal septal motion consistent with right ventricular pacing. Left Atrium: The atrium is mildly dilated. Aortic Valve: The aortic valve is trileaflet. The leaflets are moderately thickened. The leaflets are moderately calcified. The leaflets exhibit normal mobility. There is mild regurgitation. There is mild to moderate stenosis. Calculated valve area 1.45 cm2 with mean gradient around 14 mmHg. Mitral Valve: There is moderate annular calcification. There is mild regurgitation. Tricuspid Valve: There is mild regurgitation. Pulmonary artery pressure around 35 mmHg. Pulmonic Valve: There is mild regurgitation. This is a limited study due to lack of patient's cooperation and his body habitus. Cardiac EP device report    Result Date: 10/19/2023  Narrative: MDT SC/PM-ACTIVE SYSTEM IS MRI CONDITIONAL CARELINK TRANSMISSION: BATTERY VOLTAGE ADEQUATE. (13 YRS)  99%. ALL AVAILABLE LEAD PARAMETERS WITHIN NORMAL LIMITS. 1 VHR EPISODE DETECTED 14 BEATS @ 330ms. PATIENT IS NOT ON BBLOCKER; EF 60% (2023). NORMAL DEVICE FUNCTION. ---CHAVIRA     Cardiac EP device report    Result Date: 10/19/2023  Narrative: MDT SC/PM-ACTIVE SYSTEM IS MRI CONDITIONAL CARELINK TRANSMISSION: BATTERY VOLTAGE ADEQUATE. (13 YRS)  99%. ALL AVAILABLE LEAD PARAMETERS WITHIN NORMAL LIMITS. 1 VHR EPISODE DETECTED 14 BEATS @ 330ms. PATIENT IS NOT ON BBLOCKER; EF 60% (2023). NORMAL DEVICE FUNCTION. ---CHAVIRA     Echo complete     Result Date: 04/14/2023    Left Ventricle Left ventricular cavity size is normal. Wall thickness is mildly increased. There is mild concentric hypertrophy. The left ventricular ejection fraction is 60% by visual estimation. Systolic function is normal.  Wall motion is normal. Diastolic function is mildly abnormal, consistent with grade I (abnormal) relaxation. Right Ventricle Right ventricular cavity size is normal. Systolic function is normal. Wall thickness is normal.   Left Atrium The atrium is moderately dilated. Right Atrium The atrium is normal in size. Aortic Valve The aortic valve is trileaflet. The leaflets are moderately thickened. The leaflets are moderately calcified. There is moderately reduced mobility. There is mild regurgitation. The aortic valve has no significant stenosis. 2D there appears to be moderate aortic stenosis, mean gradient 25 and peak gradient 50 mmHg. By continuity patient's aortic valve area is calculated to 1.2 cm 2. Mitral Valve There is severe annular calcification. There is moderate regurgitation. There is mild stenosis. Mean gradient around 5 mmHg. Tricuspid Valve There is mild to moderate regurgitation. PAP 40 mm of Hg. There is no evidence of stenosis. Pulmonic Valve There is mild regurgitation. There is no evidence of stenosis. Ascending Aorta The aortic root is normal in size. IVC/SVC The inferior vena cava is normal in size. Respirophasic changes were normal.   Pericardium There is a small pericardial effusion. Imaging: I have personally reviewed pertinent reports. VAS lower limb venous duplex study, complete bilateral    Result Date: 11/8/2023  Narrative:  [facility] REPORT CLINICAL: Indications: Patient presents with an elevated D-dimer, and physician wants to rule out DVT. Patient denies any pain or swelling in his legs. Operative History: 2023-04-14 Pacemaker Risk Factors The patient has history of HTN, Hyperlipidemia, CKD and CAD. CONCLUSION: RIGHT LOWER LIMB: No evidence of acute or chronic deep vein thrombosis.  No evidence of superficial thrombophlebitis noted. Doppler evaluation shows a normal response to augmentation maneuvers. Popliteal and anterior tibial arterial Doppler waveform's are triphasic. 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 LEFT LOWER LIMB: No evidence of acute or chronic deep vein thrombosis. No evidence of superficial thrombophlebitis noted. Doppler evaluation shows a normal response to augmentation maneuvers. Popliteal, posterior tibial and anterior tibial arterial Doppler waveform's are triphasic. XR chest 1 view portable    Result Date: 11/7/2023    Impression: No acute cardiopulmonary disease. CT head wo contrast    Result Date: 11/7/2023    Impression: No acute intracranial abnormality. Chronic microangiopathic changes. Small metallic object in left external auditory canal, indeterminate if this is a hearing aid. Recommend direct visualization for further evaluation. EKG/ Monitor: Personally reviewed.      11/07/23 EKG: Sinus rhythm with complete heart block and Ventricular-paced rhythm, rate 70 bpm.      Code Status: Prior  Advance Directive and Living Will:      POLST:        James Sanchez PA-C

## 2023-11-08 NOTE — PLAN OF CARE
Problem: OCCUPATIONAL THERAPY ADULT  Goal: Performs self-care activities at highest level of function for planned discharge setting. See evaluation for individualized goals. Description: Treatment Interventions: ADL retraining, Functional transfer training, UE strengthening/ROM, Endurance training, Patient/family training, Equipment evaluation/education, Compensatory technique education, Continued evaluation, Energy conservation, Activityengagement  Equipment Recommended: Shower/Tub chair with back ($)       See flowsheet documentation for full assessment, interventions and recommendations. Note: Limitation: Decreased ADL status, Decreased cognition, Decreased endurance, Decreased self-care trans, Decreased high-level ADLs, Decreased UE strength, Decreased UE ROM (impaired activity tolerance, pain, standing balance, generalized weakness, insight into deficits, problem solving, emotional responses)     Assessment: Pt is an 79yo male admitted to [facility] on 11/6/23 from [facility] w/ aggressive and combative behavior; diagnosed w/ COVID 2 days prior to admission. Pt resides at [facility] w/ his wife. Pt needs assistance w/ IADLs at baseline and does not use AD for functional mobility. Upon eval, pt responsive and pleasantly confused. Pt required S to complete grooming, S UBD, min A LBD, min A sit <> stand, min A w/ out use of AD for functional mobility. Pt is completing ADLs below baseline level of I w/ decreased activity tolerance, decreased endurance, decreased balance, limited insight into deficits. Pt would benefit from OT in acute care to maximize functional independence. Recommend level III rehab resource intensity when medically stable.  Will continue to follow     Rehab Resource Intensity Level, OT: III (Minimum Resource Intensity)

## 2023-11-08 NOTE — PHYSICAL THERAPY NOTE
PHYSICAL THERAPY EVALUATION/TREATMENT     11/08/23 1215   PT Last Visit   PT Visit Date 11/08/23   Note Type   Note type Evaluation   Pain Assessment   Pain Assessment Tool Jarrell-Baker FACES   Jarrell-Baker FACES Pain Rating 4   Pain Location/Orientation Orientation: Right  (elbow)   Pain Onset/Description Frequency: Intermittent   Effect of Pain on Daily Activities limits comfort and mobility   Patient's Stated Pain Goal No pain   Hospital Pain Intervention(s) Repositioned; Ambulation/increased activity; Emotional support; Rest   Restrictions/Precautions   Other Precautions Cognitive; Fall Risk;Bed Alarm; Chair Alarm;Pain; Airborne/isolation;Contact/isolation  (Saul on room air;(+) COVID-19)   Home Living   Type of Home Assisted living  [facility])   Home Equipment   (pt denies use of AD)   Prior Function   Level of [place] Independent with functional mobility; Needs assistance with ADLs; Needs assistance with IADLS  (Per chart, pt was "walking" at [facility])   Lives With Spouse; Facility staff   Receives Help From Personal care attendant   IADLs Family/Friend/Other provides transportation; Family/Friend/Other provides meals; Family/Friend/Other provides medication management   Falls in the last 6 months   (unknown)   Vocational Retired   Comments Per chart and pt, pt amb w/out an AD PTA   General   Additional Pertinent History Pt is adm 2/2 aggressive and combative behavior, agitation;pt found to be COVID-19 (+)   Family/Caregiver Present No   Cognition   Overall Cognitive Status Impaired   Arousal/Participation Cooperative   Orientation Level Oriented to person;Oriented to place; Disoriented to time;Disoriented to situation  (states "hospital" but does not know name of hospital)   Memory Decreased recall of precautions;Decreased recall of recent events;Decreased short term memory;Decreased recall of biographical information;Decreased long term memory   Following Commands Follows one step commands with increased time or repetition   Comments At least 2 pt identifiers including name and    Subjective   Subjective Pt agreeable to PT session   RLE Assessment   RLE Assessment WFL  (functionally 3 to 3+/5)   LLE Assessment   LLE Assessment WFL  (functionally 3 to 3+/5)   Bed Mobility   Supine to Sit 4  Minimal assistance   Additional items Assist x 1;Verbal cues; Increased time required;LE management  (trunk management)   Transfers   Sit to Stand 4  Minimal assistance   Additional items Assist x 1;Verbal cues; Increased time required   Stand to Sit 4  Minimal assistance   Additional items Assist x 1;Verbal cues; Increased time required;Armrests   Ambulation/Elevation   Gait pattern Short stride; Step to; Wide ANA; Decreased foot clearance;Decreased heel strike  (initially mod/min generalized unsteadiness, improves with increased mobility)   Gait Assistance 4  Minimal assist   Additional items Assist x 1;Verbal cues; Tactile cues   Assistive Device None  (min hand hold A)   Distance 12 feet with change in direction   Balance   Static Sitting Fair   Static Standing Fair -   Ambulatory   (P+ to F-)   Activity Tolerance   Activity Tolerance Patient limited by fatigue;Treatment limited secondary to medical complications (Comment)  (impaired cognition)   Assessment   Problem List Decreased strength;Decreased endurance; Impaired balance;Decreased mobility; Decreased coordination;Decreased cognition; Impaired judgement;Decreased safety awareness;Pain   Assessment Patient seen for Physical Therapy evaluation. Patient admitted with Acute encephalopathy.   Comorbidities affecting patient's physical performance include: HTN, HLD, gout, ANN MARIE on CKD, dementia, bradycardia, RBBB incomplete, MI.  Personal factors affecting patient at time of initial evaluation include: inability to navigate community distances, inability to navigate level surfaces without external assistance, inability to perform dynamic tasks in community, decreased cognition, limited insight into impairments, and inability to live alone. Prior to admission, patient was independent with functional mobility without assistive device, requiring assist for ADLS, requiring assist for IADLS, ambulating household distance, and an assisted living resident. Please find objective findings from Physical Therapy assessment regarding body systems outlined above with impairments and limitations including weakness, impaired balance, decreased endurance, impaired coordination, gait deviations, pain, decreased activity tolerance, decreased functional mobility tolerance, decreased safety awareness, impaired judgement, fall risk, and decreased cognition. The Barthel Index was used as a functional outcome tool presenting with a score of Barthel Index Score: 45 today indicating marked limitations of functional mobility and ADLS. Patient's clinical presentation is currently unstable/unpredictable as seen in patient's presentation of vital sign response, changing level of pain, varying levels of cognitive performance, increased fall risk, new onset of impairment of functional mobility, decreased endurance, and new onset of weakness. Pt would benefit from continued Physical Therapy treatment to address deficits as defined above and maximize level of functional mobility. As demonstrated by objective findings, the assigned level of complexity for this evaluation is high. The patient's -[facility] Basic Mobility Inpatient Short Form Raw Score is 16. A Raw score of less than or equal to 16 suggests the patient may benefit from discharge to post-acute rehabilitation services. Please also refer to the recommendation of the Physical Therapist for safe discharge planning.    Goals   Patient Goals pt unable to state due to impaired cognition   STG Expiration Date 11/15/23   Short Term Goal #1 Patient will: Perform all bed mobility tasks w/ supervision to improve pt's independence w/ repositioning for decrease risk of skin breakdown, Perform all transfers w/ supervision consistently from various height surfaces in order to improve I w/ engagement w/ real-world environments/situations, Ambulate at least 75 ft. with least restrictive assistive device or no AD w/ supervision w/o LOB to facilitate return and engagement w/ previous living environment, Increase all balance 1 grade to decrease risk for falls, Tolerate at least 15 consecutive minutes of activity to demonstrate improved activity tolerance and endurance, and Tolerate 3 hr OOB to faciliate upright tolerance   LTG Expiration Date 11/22/23   Long Term Goal #1 Patient will: Increase bilateral LE strength 1 grade to facilitate independent mobility, Perform all bed mobility tasks independently to improve pt's independence w/ repositioning for decrease risk of skin breakdown, Perform all transfers independently consistently from various height surfaces in order to improve I w/ engagement w/ real-world environments/situations, Ambulate at least 125 ft. with least restrictive assistive device or no AD independently w/o LOB to facilitate return and engagement w/ previous living environment, and Increase ambulatory and static and dynamic standing balance 1 grade to decrease risk for falls   Plan   Treatment/Interventions ADL retraining;Functional transfer training;LE strengthening/ROM; Therapeutic exercise; Endurance training;Cognitive reorientation;Patient/family training;Equipment eval/education; Bed mobility;Gait training; Compensatory technique education;Spoke to case management;OT;Spoke to nursing   PT Frequency Other (Comment)  (5x/wk)   Discharge Recommendation   Rehab Resource Intensity Level, PT II (Moderate Resource Intensity)   AM-PAC Basic Mobility Inpatient   Turning in Flat Bed Without Bedrails 3   Lying on Back to Sitting on Edge of Flat Bed Without Bedrails 3   Moving Bed to Chair 3   Standing Up From Chair Using Arms 3   Walk in Room 3   Climb 3-5 Stairs With Railing 1 Basic Mobility Inpatient Raw Score 16   Basic Mobility Standardized Score 38.32   Highest Level Of Mobility   -[facility] Goal 5: Stand one or more mins   -[facility] Achieved 6: Walk 10 steps or more   Barthel Index   Feeding 5   Bathing 0   Grooming Score 0   Dressing Score 5   Bladder Score 10   Bowels Score 10   Toilet Use Score 5   Transfers (Bed/Chair) Score 10   Mobility (Level Surface) Score 0   Stairs Score 0   Barthel Index Score 45   Additional Treatment Session   Start Time 1215   End Time 1225   Treatment Assessment Pt agreeable to additional trial of ambulation. Recommended use of RW to pt but pt declined - "I don't need that." Pt trans from low chair with mod/min A + 1 and amb with min A progressing to CGA in room x 25 feet with change in direction. Pt trans stand to sit with CGA/close Sup. A: Pt with fairly good tolerance to PT eval and treat. While adm, pt will cont to benefit from skilled PT services to prevent loss of strength, endurance, balance, functional mobility and gait. End of Consult   Patient Position at End of Consult Bed/Chair alarm activated; Bedside chair; All needs within reach   [facility] [employer] Insurance Number  [address] [place], [place] [id number]

## 2023-11-08 NOTE — CASE MANAGEMENT
Case Management Discharge Planning Note    Patient name Shahla Fountain  Location [address] [place] [address]/[address]-* MRN [id number]  : 1935 Date 2023       Current Admission Date: 2023  Current Admission Diagnosis:Acute encephalopathy   Patient Active Problem List    Diagnosis Date Noted    Acute kidney injury superimposed on CKD  2023    COVID-19 2023    Acute encephalopathy 2023    Dementia ([address]) 2023    Elevated AST (SGOT) 2023    Microscopic hematuria 2023    Elevated brain natriuretic peptide (BNP) level 2023    Elevated troponin 2023    Pacemaker 2023    MI (myocardial infarction) ([address]) 2023    Second degree heart block by electrocardiogram (ECG) 2023    Bradycardia 2023    History of heart attack 2023    Resistant hypertension 2023    Essential hypertension 2023    Hyperlipidemia 2023    History of gout 2023    Moderate dementia without behavioral disturbance, psychotic disturbance, mood disturbance, or anxiety ([address]) 2023    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 - [address]  [address]  [address] [address]  Phone: [phone] Fax: [phone]    Primary Care Provider: No primary care provider on file.     Primary Insurance: MEDICARE  Secondary Insurance: BLUE CROSS    DISCHARGE DETAILS:    Discharge planning discussed with[de-identified] Daughter [place]: Yes  Comments - Freedom of Choice: CM called and spoke to patient's Daughter Lauryn Shrestha who informed CM family is choosing to move their parents into a LTC nursing facility. Lauryn Alvertoshabana gave consent for CM to sent blanket referrals for SNF facilities. Lauryn Del Toroshabana requested if possible, to send referrals as close to [facility] or [facility] as possible. CM contacted family/caregiver?: Yes    Contacts  Patient Contacts: Daughter Lauryn Shrestha  Relationship to Patient[de-identified] Family  Contact Method: Phone  Phone Number: [phone]  Reason/Outcome: Continuity of Care, Discharge Planning    Other Referral/Resources/Interventions Provided:  Referral Comments: CM sent SNF blanket referrals in [place]. CM returned call from patient's Daughter Lauryn Shrestha. Taylor informed CM of family's decision to move their parents to a LTC nursing facility. Lauryn Del Toroshabana gave consent for SNF referrals to be sent. CM made physician aware of family's plan for LTC. CM made Lauryn Shrestha aware that PT/OT evaluations are pending and patient may be recommended for STR. CM made Lauryn Shrestha aware that when referrals are sent facilities will be informed patient may be recommended for STR but goal is to be a LTC resident. Lauryn Shrestha verbalized her understanding. CM also noted in referrals to facilities that family would like to speak with chosen facility about patient's wife/their mother also moving to LTC care. CM will provide Lauryn Shrestha with an update once availability information for facilities is received. Lauryn Shrestha stated the family is aware of the cost of LTC and how payment is OOP until assets are reduced.

## 2023-11-09 PROBLEM — D69.6 THROMBOCYTOPENIA (HCC): Status: ACTIVE | Noted: 2023-11-09

## 2023-11-09 LAB
ANION GAP SERPL CALCULATED.3IONS-SCNC: 9 MMOL/L
BUN SERPL-MCNC: 36 MG/DL (ref 5–25)
CALCIUM SERPL-MCNC: 8.2 MG/DL (ref 8.4–10.2)
CHLORIDE SERPL-SCNC: 112 MMOL/L (ref 96–108)
CO2 SERPL-SCNC: 20 MMOL/L (ref 21–32)
CREAT SERPL-MCNC: 1.51 MG/DL (ref 0.6–1.3)
ERYTHROCYTE [DISTWIDTH] IN BLOOD BY AUTOMATED COUNT: 13.8 % (ref 11.6–15.1)
GFR SERPL CREATININE-BSD FRML MDRD: 40 ML/MIN/1.73SQ M
GLUCOSE SERPL-MCNC: 80 MG/DL (ref 65–140)
HCT VFR BLD AUTO: 37.7 % (ref 36.5–49.3)
HGB BLD-MCNC: 12.4 G/DL (ref 12–17)
MCH RBC QN AUTO: 32 PG (ref 26.8–34.3)
MCHC RBC AUTO-ENTMCNC: 32.9 G/DL (ref 31.4–37.4)
MCV RBC AUTO: 97 FL (ref 82–98)
PLATELET # BLD AUTO: 123 THOUSANDS/UL (ref 149–390)
PMV BLD AUTO: 12.2 FL (ref 8.9–12.7)
POTASSIUM SERPL-SCNC: 4.3 MMOL/L (ref 3.5–5.3)
RBC # BLD AUTO: 3.87 MILLION/UL (ref 3.88–5.62)
SODIUM SERPL-SCNC: 141 MMOL/L (ref 135–147)
WBC # BLD AUTO: 4.79 THOUSAND/UL (ref 4.31–10.16)

## 2023-11-09 PROCEDURE — 85027 COMPLETE CBC AUTOMATED: CPT | Performed by: INTERNAL MEDICINE

## 2023-11-09 PROCEDURE — 99232 SBSQ HOSP IP/OBS MODERATE 35: CPT | Performed by: INTERNAL MEDICINE

## 2023-11-09 PROCEDURE — 97535 SELF CARE MNGMENT TRAINING: CPT

## 2023-11-09 PROCEDURE — 80048 BASIC METABOLIC PNL TOTAL CA: CPT | Performed by: INTERNAL MEDICINE

## 2023-11-09 RX ORDER — HALOPERIDOL 5 MG/ML
2 INJECTION INTRAMUSCULAR ONCE
Status: COMPLETED | OUTPATIENT
Start: 2023-11-09 | End: 2023-11-09

## 2023-11-09 RX ORDER — QUETIAPINE FUMARATE 25 MG/1
25 TABLET, FILM COATED ORAL
Status: DISCONTINUED | OUTPATIENT
Start: 2023-11-09 | End: 2023-11-10

## 2023-11-09 RX ADMIN — GUAIFENESIN 600 MG: 600 TABLET, EXTENDED RELEASE ORAL at 20:46

## 2023-11-09 RX ADMIN — GUAIFENESIN 600 MG: 600 TABLET, EXTENDED RELEASE ORAL at 08:25

## 2023-11-09 RX ADMIN — DIVALPROEX SODIUM 250 MG: 250 TABLET, DELAYED RELEASE ORAL at 20:46

## 2023-11-09 RX ADMIN — CARVEDILOL 3.12 MG: 3.12 TABLET, FILM COATED ORAL at 15:31

## 2023-11-09 RX ADMIN — DIVALPROEX SODIUM 250 MG: 250 TABLET, DELAYED RELEASE ORAL at 08:25

## 2023-11-09 RX ADMIN — HEPARIN SODIUM 5000 UNITS: 5000 INJECTION INTRAVENOUS; SUBCUTANEOUS at 14:24

## 2023-11-09 RX ADMIN — DICYCLOMINE HYDROCHLORIDE 10 MG: 10 CAPSULE ORAL at 15:31

## 2023-11-09 RX ADMIN — QUETIAPINE FUMARATE 25 MG: 25 TABLET ORAL at 21:02

## 2023-11-09 RX ADMIN — CYANOCOBALAMIN TAB 500 MCG 1000 MCG: 500 TAB at 08:25

## 2023-11-09 RX ADMIN — HEPARIN SODIUM 5000 UNITS: 5000 INJECTION INTRAVENOUS; SUBCUTANEOUS at 05:16

## 2023-11-09 RX ADMIN — REMDESIVIR 100 MG: 100 INJECTION, POWDER, LYOPHILIZED, FOR SOLUTION INTRAVENOUS at 02:49

## 2023-11-09 RX ADMIN — PRAVASTATIN SODIUM 20 MG: 20 TABLET ORAL at 15:31

## 2023-11-09 RX ADMIN — HALOPERIDOL LACTATE 2 MG: 5 INJECTION, SOLUTION INTRAMUSCULAR at 02:51

## 2023-11-09 RX ADMIN — HEPARIN SODIUM 5000 UNITS: 5000 INJECTION INTRAVENOUS; SUBCUTANEOUS at 21:02

## 2023-11-09 RX ADMIN — ALLOPURINOL 100 MG: 100 TABLET ORAL at 08:25

## 2023-11-09 RX ADMIN — AZITHROMYCIN DIHYDRATE 500 MG: 250 TABLET ORAL at 02:39

## 2023-11-09 RX ADMIN — Medication 3 MG: at 21:02

## 2023-11-09 RX ADMIN — DICYCLOMINE HYDROCHLORIDE 10 MG: 10 CAPSULE ORAL at 08:25

## 2023-11-09 RX ADMIN — DONEPEZIL HYDROCHLORIDE 5 MG: 5 TABLET ORAL at 21:02

## 2023-11-09 NOTE — PROGRESS NOTES
[address]  Progress Note  Name: Autumn Sanchez  MRN: [id number]  Unit/Bed#: 913 [facility] 403-01 I Date of Admission: 11/6/2023   Date of Service: 11/9/2023 I Hospital Day: 2    Assessment/Plan   * Acute encephalopathy  Assessment & Plan  Patient with baseline dementia but per family is generally cooperative, mild-mannered. Lives at assisted living facility, was sent to the ER after he became combative agitated combative and agitated. Diagnosed with COVID prior to arrival on 11/4    Encephalopathy appears to be secondary to his COVID-19 infection  Status post 3 days of IV remdesivir  Patient also has some new changes on echocardiogram with a mild drop in EF and some elevated troponin. Not felt to be acute cardiac event per cardiology. He is being optimized with addition of low-dose carvedilol. Patient has had some episodes of agitation and impulsiveness. Was confused overnight 11/8 to 11/9. Will start on low-dose Seroquel nightly. Elevated troponin  Assessment & Plan  Troponin around 950 on admission but was asymptomatic from a cardiac standpoint  Not felt to be an acute cardiac event per cardiology  Being optimized with addition of carvedilol    COVID-19  Assessment & Plan  Initially diagnosed on 11/4 at assisted living facility  Patient continues to be asymptomatic from respiratory standpoint and stable on room air. Encephalopathy is improving  Completed 3 days of IV remdesivir as patient is high risk given his dementia, comorbidities, and age. Contain contact and airborne precautions.     Acute kidney injury superimposed on CKD   Assessment & Plan  Prerenal etiology in the setting of COVID-19 infection and decreased p.o. intake  Not in and baseline appears to be around 1.4; now stable around 1.5  Continue to monitor    Dementia [facility])  Assessment & Plan  History of dementia but is typically mild-mannered and well behaved at the assisted living facility where he resides  Became Dianna Patiño is a 78 year old White  female.    Chief Complaint   Patient presents with   • Neck Pain   • Office Visit       HPI: Dianna Patiño is a 78 year old female with PMHx of HLD, allergies, thyroid nodules, SIADH, vertigo, ERWIN, cervical DDD s/p laminectomy with spinal fusion who presents for neck pain.     [facility] then [facility]. Had been recommended surgery before and declined. Seeing Dr. Patel at [facility], had \"a bar put in my neck\". This was 2018.   Neck pain and mobility limitations for the last several weeks to the point she is in a lot of pain, poor sleep, poor QOL. She reports she \"is willing to do anything at this point\" for relief.   Has completed multiple rounds of PT, botox, spinal injection.  Using aleeve and topical analgesic patches now.   She reports hallucinations with tizanidine, fall and sedation with gabapentin, sedation and intolerance to opioids.         ROS:  Review of Systems   Constitutional: Negative for chills and fever.   Eyes: Negative for visual disturbance.   Musculoskeletal: Positive for neck pain and neck stiffness. Negative for gait problem.   Neurological: Negative for syncope and numbness.   Psychiatric/Behavioral: Positive for sleep disturbance. Negative for dysphoric mood. The patient is not nervous/anxious.    All other systems reviewed and are negative.      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)   • Status post laminectomy with spinal fusion   • Retinal artery occlusion   • Urge incontinence of urine   •  Urge and stress incontinence   • Leukopenia, unspecified type   • Vitamin D deficiency   • Mild protein-calorie malnutrition (CMD)   • IFG (impaired fasting glucose)   • Aneurysm (CMD)   • Vertigo   • Impaired functional mobility, balance, and endurance   • Screening for colon cancer   • Visit for screening mammogram     Past Medical History:   Diagnosis Date   • Aneurysm (CMD)    • Arthritis    • Basal cell carcinoma of skin    • Bladder incontinence    • CAD (coronary artery disease)    • Carotid artery stenosis    • Cervical neuropathy    • Chronic rhinitis    • Dyslipidemia    • Essential hypertension    • Hoarseness    • Lyme disease    • Malignant neoplasm (CMD)    • Neck pain    • OAB (overactive bladder)    • Ringing in ears    • Scoliosis    • Sleep apnea     cpap mask   • SNHL (sensorineural hearing loss)    • Snoring    • Thyroid nodule    • Urgency of urination          PMH, PSH, FH, and social history reviewed.     Medications:  Current Outpatient Medications   Medication Sig Dispense Refill   • naproxen 220 MG capsule Take 1 capsule by mouth every 12 hours.     • azelastine-fluticasone (DYMISTA) 137-50 MCG/ACT Suspension Spray 1 spray in each nostril every 12 hours.     • IBUPROFEN PO Take by mouth as needed.     • Vitamin D, Ergocalciferol, 1.25 mg (50,000 units) capsule Take 1 capsule by mouth 1 day a week. 13 capsule 0   • oxybutynin (DITROPAN) 5 MG tablet Take 1 tablet by mouth at bedtime. 90 tablet 3   • losartan (COZAAR) 50 MG tablet Take 1 tablet by mouth daily. 90 tablet 3   • fluticasone (FLONASE) 50 MCG/ACT nasal spray Spray 1 spray in each nostril 1 time. 1 each 3   • azelastine (ASTELIN) 0.1 % nasal spray Spray 1 spray in each nostril in the morning and 1 spray in the evening. Use in each nostril as directed 30 mL 3   • atorvastatin (LIPITOR) 80 MG tablet Take 1 tablet by mouth daily. 90 tablet 3   • amLODIPine (NORVASC) 5 MG tablet Take 1 tablet by mouth daily. 90 tablet 3   • EPINEPHrine  combative and agitated prompting his visit to the ER. Also has COVID. Seen by psychiatry while inpatient; no new changes recommended, they recommended outpatient follow-up with psychiatry. Family working on transitioning patient to a longer term care facility. Will likely need rehab on discharge  Continue home Depakote and Aricept      Pacemaker  Assessment & Plan  Noted history. Hyperlipidemia  Assessment & Plan  Continue low-dose statin    Essential hypertension  Assessment & Plan  BP has been labile while hospitalized  We will hold home losartan for now  Continue carvedilol        VTE Pharmacologic Prophylaxis: VTE Score: 5 High Risk (Score >/= 5) - Pharmacological DVT Prophylaxis Ordered: heparin. Sequential Compression Devices Ordered. Patient Centered Rounds: I performed bedside rounds with nursing staff today. Discussions with Specialists or Other Care Team Provider: MAYTE.     Education and Discussions with Family / Patient: Updated  (daughter) via phone. Total Time Spent on Date of Encounter in care of patient: 35 mins. This time was spent on one or more of the following: performing physical exam; counseling and coordination of care; obtaining or reviewing history; documenting in the medical record; reviewing/ordering tests, medications or procedures; communicating with other healthcare professionals and discussing with patient's family/caregivers. Current Length of Stay: 2 day(s)  Current Patient Status: Inpatient   Certification Statement: The patient will continue to require additional inpatient hospital stay due to further cardiac evaluation, disposition planning, serial lab monitoring, PT/OT evaluation, IV antivirals. Discharge Plan: Anticipate discharge in 24-48 hrs to rehab facility. Code Status: Prior    Subjective:   Patient seen and examined. Patient with no complaints this morning. Eating breakfast comfortably.   Had a bit of agitation and impulsive this last (EpiPen 2-Rajinder) 0.3 MG/0.3ML auto-injector Inject 0.3 mLs into the muscle 1 time for 1 dose. 2 each 1   • Cholecalciferol (VITAMIN D3) 125 mcg (5,000 units) capsule Take by mouth daily. Indications: 1 daily     • B Complex Vitamins (VITAMIN-B COMPLEX PO) Take 100 mg by mouth daily.     • MEGARED OMEGA-3 KRILL  MG Cap Take by mouth daily.     • Calcium-Vitamin D-Vitamin K (VIACTIV PO) Take by mouth daily.     • aspirin (ECOTRIN) 325 MG EC tablet 1 by mouth daily     • Multiple Vitamins-Minerals (OCUVITE-LUTEIN) Tab one tablet daily     • magnesium 250 MG tablet Take 250 mg by mouth daily.        No current facility-administered medications for this visit.       PHYSICAL EXAM:  Visit Vitals  /82 (BP Location: LUE - Left upper extremity, Patient Position: Sitting, Cuff Size: Regular)   Pulse 79   Temp 96.9 °F (36.1 °C) (Tympanic)   Resp 16   Ht 5' 7\" (1.702 m)   Wt 55.2 kg (121 lb 9.6 oz)   SpO2 96%   BMI 19.05 kg/m²        Physical Exam  Vitals reviewed.   Constitutional:       General: She is not in acute distress.     Appearance: She is not toxic-appearing.   HENT:      Neck: Neck supple. Deformity, spasms, tenderness and bony tenderness present. No rigidity or torticollis. Pain with movement present. Decreased range of motion (severely limited, zero flexion or extension, minimal lateral rotation).   Eyes:      Conjunctiva/sclera: Conjunctivae normal.   Cardiovascular:      Rate and Rhythm: Normal rate and regular rhythm.      Pulses: Normal pulses.      Heart sounds: Normal heart sounds.   Pulmonary:      Effort: Pulmonary effort is normal.      Breath sounds: Normal breath sounds.   Neurological:      General: No focal deficit present.      Mental Status: She is alert and oriented to person, place, and time.   Psychiatric:         Mood and Affect: Mood normal.      Comments: Tangential speech         Labs & Tests Reviewed:   Recent PHQ 2/9 Score    PHQ 2:  PHQ 2 Score Adult PHQ 2 Score Adult PHQ 2  night.    Objective:     Vitals:   Temp (24hrs), Av °F (36.7 °C), Min:97.3 °F (36.3 °C), Max:99.1 °F (37.3 °C)    Temp:  [97.3 °F (36.3 °C)-99.1 °F (37.3 °C)] 97.3 °F (36.3 °C)  HR:  [69-70] 70  Resp:  [18] 18  BP: (103-162)/(55-82) 107/64  SpO2:  [94 %-97 %] 96 %  Body mass index is 22.31 kg/m². Input and Output Summary (last 24 hours):   No intake or output data in the 24 hours ending 23 1056      PHYSICAL EXAM:    Vitals signs reviewed  Constitutional   Awake and cooperative. NAD. Head/Neck   Normocephalic. Atraumatic. HEENT   No scleral icterus. EOMI. Heart   Regular rate and rhythm. No murmers. Lungs   Clear to auscultation bilaterally. Respirations unlaboured. Abdomen   Soft. Nontender. Nondistended. Skin   Skin color normal. No rashes. Extremities   No deformities. No peripheral edema. Neuro   Alert and oriented. No new deficits. Psych   Mood stable. Affect flat. Additional Data:     Labs:  Results from last 7 days   Lab Units 23   WBC Thousand/uL 4.79 3.76*   HEMOGLOBIN g/dL 12.4 12.4   HEMATOCRIT % 37.7 37.6   PLATELETS Thousands/uL 123* 107*   NEUTROS PCT %  --  60   LYMPHS PCT %  --  25   MONOS PCT %  --  14*   EOS PCT %  --  0     Results from last 7 days   Lab Units 23  0828 23  0554   SODIUM mmol/L 141   < > 138   POTASSIUM mmol/L 4.3   < > 4.0   CHLORIDE mmol/L 112*   < > 108   CO2 mmol/L 20*   < > 21   BUN mg/dL 36*   < > 38*   CREATININE mg/dL 1.51*   < > 1.78*   ANION GAP mmol/L 9   < > 9   CALCIUM mg/dL 8.2*   < > 7.5*   ALBUMIN g/dL  --   --  3.2*   TOTAL BILIRUBIN mg/dL  --   --  0.35   ALK PHOS U/L  --   --  50   ALT U/L  --   --  22   AST U/L  --   --  42*   GLUCOSE RANDOM mg/dL 80   < > 81    < > = values in this interval not displayed.                  Results from last 7 days   Lab Units 23  1414   PROCALCITONIN ng/ml 1.27*       Lines/Drains:  Invasive Devices       Peripheral Intravenous Interpretation Little interest or pleasure in activity?   8/4/2023   3:37 PM 0 No further screening needed 0       PHQ 9:     PHQ-2/9 Depression Screening  Little interest or pleasure in activity?: Not at all  Feeling down, depressed or hopeless?: Not at all  Initial depression screening score:: 0  PHQ2 Interpretation: No further screening needed     The 10-year ASCVD risk score (Lachelle SANCHES, et al., 2019) is: 30.1%    Values used to calculate the score:      Age: 78 years      Sex: Female      Is Non- : No      Diabetic: No      Tobacco smoker: No      Systolic Blood Pressure: 130 mmHg      Is BP treated: Yes      HDL Cholesterol: 76 mg/dL      Total Cholesterol: 205 mg/dL      Assessment & Plan:  1. Chronic neck pain  - Continue naproxen 220-440 q 12 hrs prn for pain.  - Continue topical analgesics for pain.  - Declines gabapentin, muscle relaxers, or narcotics. Reports adverse side effects to all.  - Update MRI C-spine  - Ref to spine  - MRI cervical spine wo IV contrast; Future  - SERVICE TO SPINE    2. Cervical disc disease  - As above  - MRI cervical spine wo IV contrast; Future  - SERVICE TO SPINE    3. Cervical radiculopathy  - AS above  - MRI cervical spine wo IV contrast; Future  - SERVICE TO SPINE    4. Status post laminectomy with spinal fusion  - As above  - MRI cervical spine wo IV contrast; Future  - SERVICE TO SPINE        Patient verbalized understanding and is in agreement with the above plan. Follow up if new or worsening sxs develop.      Return if symptoms worsen or fail to improve.    Savanah Johnson PA-C   Line  Duration             Peripheral IV 11/06/23 Right Antecubital 3 days                          Imaging: Reviewed radiology reports from this admission including: ECHO    Recent Cultures (last 7 days):         Last 24 Hours Medication List:   Current Facility-Administered Medications   Medication Dose Route Frequency Provider Last Rate    acetaminophen  650 mg Oral Q6H PRN Cuivijaya Acevedo, ADENP      allopurinol  100 mg Oral Daily Cuiyienedina Acevedo, SHERRY      carvedilol  3.125 mg Oral BID With Meals Kelley Yang PA-C      vitamin B-12  1,000 mcg Oral Daily Cuiyienedina Yanrichristi, SHERRY      dicyclomine  10 mg Oral BID AC Cuiyin Yurik, SHERRY      divalproex sodium  250 mg Oral Q12H [address] Cuiyin Yurichristi, SHERRY      donepezil  5 mg Oral HS Cuiyienedina Yurichristi, SHERRY      guaiFENesin  600 mg Oral Q12H [address] Cuiyin Yurichristi, SHERRY      heparin (porcine)  5,000 Units Subcutaneous Q8H [address] Cuivijaya Acevedo, SHERRY      melatonin  3 mg Oral HS Gonzales Knox MD      ondansetron  4 mg Intravenous Q6H PRN SHERRY Rolon      pravastatin  20 mg Oral Daily With SHERRY Perez      QUEtiapine  25 mg Oral HS Otto Ledezma DO          Today, Patient Was Seen By: Madeleine Gray DO    **Please Note: This note may have been constructed using a voice recognition system. **

## 2023-11-09 NOTE — ASSESSMENT & PLAN NOTE
Initially diagnosed on 11/4 at assisted living facility  Patient continues to be asymptomatic from respiratory standpoint and stable on room air. Encephalopathy is improving  Completed 3 days of IV remdesivir as patient is high risk given his dementia, comorbidities, and age. Contain contact and airborne precautions.

## 2023-11-09 NOTE — CASE MANAGEMENT
Case Management Progress Note    Patient name Benson Bower  Location [address]4 YMIGI 491-* MRN [id number]  : 1935 Date 2023       LOS (days): 2  Geometric Mean LOS (GMLOS) (days): 3.30  Days to GMLOS:0.9        OBJECTIVE:      Current admission status: Inpatient  Preferred Pharmacy:   [facility] [address] [facility]  [address] [facility]  [address] [address]  Phone: [phone] Fax: [phone]    Primary Care Provider: No primary care provider on file. Primary Insurance: MEDICARE  Secondary Insurance: BLUE CROSS    PROGRESS NOTE:    MINGO spoke with daughter Lucho Chand by phone to discuss discharge planning. Patient has been accepted by [facility] and [facility]. Lucho Chand stated that she will not make a decision without touring the facilities and MINGO offered to have facility liaisons reach out to her which she agreed to. MINGO messaged both facilities in [address] [place] [address] to request that representatives contact Lucho Chand. Lucho Chand stated that she is feeling overwhelmed due to both of her parents needing placement at the same time and she is very angry with [facility] [place] as she did not realize that [facility] could declined to accept a resident back. MINGO e-mailed patient choice list to Lucho Chand at [email or web address] and will follow up tomorrow.

## 2023-11-09 NOTE — PLAN OF CARE
Problem: OCCUPATIONAL THERAPY ADULT  Goal: Performs self-care activities at highest level of function for planned discharge setting. See evaluation for individualized goals. Description: Treatment Interventions: ADL retraining, Functional transfer training, UE strengthening/ROM, Endurance training, Patient/family training, Equipment evaluation/education, Compensatory technique education, Continued evaluation, Energy conservation, Activityengagement  Equipment Recommended: Shower/Tub chair with back ($)       See flowsheet documentation for full assessment, interventions and recommendations. Outcome: Progressing  Note: Limitation: Decreased ADL status, Decreased cognition, Decreased endurance, Decreased self-care trans, Decreased high-level ADLs, Decreased UE strength, Decreased UE ROM (impaired activity tolerance, pain, standing balance, generalized weakness, insight into deficits, problem solving, emotional responses)     Assessment: Pt seen for OT treatment session. Pt sleeping upon OT's arrival requiring mod prompting to rouse and engage in session. Pt requiring min A and increased time to complete toileting tasks. Pt generally weak and deconditioned, limited by fatigue and pain as well as cognitive impairments. Returned to bed at end of session, bed alarm engaged and all needs in reach.      Rehab Resource Intensity Level, OT: III (Minimum Resource Intensity)

## 2023-11-09 NOTE — PLAN OF CARE
Problem: CARDIOVASCULAR - ADULT  Goal: Maintains optimal cardiac output and hemodynamic stability  Description: INTERVENTIONS:  - Monitor I/O, vital signs and rhythm  - Monitor for S/S and trends of decreased cardiac output  - Administer and titrate ordered vasoactive medications to optimize hemodynamic stability  - Assess quality of pulses, skin color and temperature  - Assess for signs of decreased coronary artery perfusion  - Instruct patient to report change in severity of symptoms  Outcome: Progressing     Problem: CARDIOVASCULAR - ADULT  Goal: Absence of cardiac dysrhythmias or at baseline rhythm  Description: INTERVENTIONS:  - Continuous cardiac monitoring, vital signs, obtain 12 lead EKG if ordered  - Administer antiarrhythmic and heart rate control medications as ordered  - Monitor electrolytes and administer replacement therapy as ordered  Outcome: Progressing     Problem: RESPIRATORY - ADULT  Goal: Achieves optimal ventilation and oxygenation  Description: INTERVENTIONS:  - Assess for changes in respiratory status  - Assess for changes in mentation and behavior  - Position to facilitate oxygenation and minimize respiratory effort  - Oxygen administered by appropriate delivery if ordered  - Initiate smoking cessation education as indicated  - Encourage broncho-pulmonary hygiene including cough, deep breathe, Incentive Spirometry  - Assess the need for suctioning and aspirate as needed  - Assess and instruct to report SOB or any respiratory difficulty  - Respiratory Therapy support as indicated  Outcome: Progressing     Problem: METABOLIC, FLUID AND ELECTROLYTES - ADULT  Goal: Electrolytes maintained within normal limits  Description: INTERVENTIONS:  - Monitor labs and assess patient for signs and symptoms of electrolyte imbalances  - Administer electrolyte replacement as ordered  - Monitor response to electrolyte replacements, including repeat lab results as appropriate  - Instruct patient on fluid and nutrition as appropriate  Outcome: Progressing     Problem: METABOLIC, FLUID AND ELECTROLYTES - ADULT  Goal: Fluid balance maintained  Description: INTERVENTIONS:  - Monitor labs   - Monitor I/O and WT  - Instruct patient on fluid and nutrition as appropriate  - Assess for signs & symptoms of volume excess or deficit  Outcome: Progressing     Problem: METABOLIC, FLUID AND ELECTROLYTES - ADULT  Goal: Glucose maintained within target range  Description: INTERVENTIONS:  - Monitor Blood Glucose as ordered  - Assess for signs and symptoms of hyperglycemia and hypoglycemia  - Administer ordered medications to maintain glucose within target range  - Assess nutritional intake and initiate nutrition service referral as needed  Outcome: Progressing

## 2023-11-09 NOTE — ASSESSMENT & PLAN NOTE
Prerenal etiology in the setting of COVID-19 infection and decreased p.o. intake  Now resolved with IV fluid resuscitation

## 2023-11-09 NOTE — ASSESSMENT & PLAN NOTE
Patient with baseline dementia but per family is generally cooperative, mild-mannered. Lives at assisted living facility, was sent to the ER after he became combative agitated combative and agitated. Diagnosed with COVID prior to arrival on 11/4    Encephalopathy appears to be secondary to his COVID-19 infection  Status post 3 days of IV remdesivir  Patient also has some new changes on echocardiogram with a mild drop in EF and some elevated troponin. Not felt to be acute cardiac event per cardiology. He is being optimized with addition of low-dose carvedilol. Has had some intermittent episodes of agitation and impulsiveness, but overall he has been cooperative.

## 2023-11-09 NOTE — ASSESSMENT & PLAN NOTE
Troponin around 950 on admission but was asymptomatic from a cardiac standpoint  Not felt to be an acute cardiac event per cardiology  Mild drop in EF on repeat echocardiogram  Appreciate cardiology recommendations; being optimized with addition of carvedilol

## 2023-11-09 NOTE — OCCUPATIONAL THERAPY NOTE
OT TREATMENT       11/09/23 1250   OT Last Visit   OT Visit Date 11/09/23   Note Type   Note Type Treatment   Pain Assessment   Pain Assessment Tool 0-10   Jarrell-Baker FACES Pain Rating 4   Pain Location/Orientation Orientation: Right  (shoulder and elbow)   Restrictions/Precautions   Other Precautions Contact/isolation; Airborne/isolation; Impulsive; Chair Alarm; Bed Alarm; Fall Risk;Pain  (+ Covid)   ADL   Toileting Assistance  4  Minimal Assistance   Toileting Deficit Steadying; Impulsive;Verbal cueing; Increased time to complete;Clothing management up;Clothing management down   Bed Mobility   Supine to Sit 4  Minimal assistance   Additional items Assist x 1;Verbal cues; Increased time required  (trunk management)   Sit to Supine 3  Moderate assistance   Additional items Assist x 1; Increased time required;Verbal cues;LE management   Transfers   Sit to Stand 4  Minimal assistance   Additional items Assist x 1;Verbal cues; Increased time required   Stand to Sit 4  Minimal assistance   Additional items Assist x 1; Increased time required;Verbal cues   Toilet transfer 4  Minimal assistance   Additional items Assist x 1; Increased time required;Verbal cues;Standard toilet;Commode   Functional Mobility   Functional Mobility 4  Minimal assistance   Additional Comments within room and bathroom, handheld assist, min A   Additional items Hand hold assistance   Toilet Transfers   Toilet Transfer From Bed   Toilet Transfer Type To and from   Toilet Transfer to Standard toilet  (with commode placed over)   Toilet Transfer Technique Ambulating   Toilet Transfers Minimal assistance   Subjective   Subjective "I don't want to wash my hands."   Cognition   Overall Cognitive Status Impaired   Arousal/Participation Responsive   Attention Attends with cues to redirect   Orientation Level Oriented to person;Disoriented to place; Disoriented to time;Disoriented to situation   Memory Decreased short term memory;Decreased recall of recent events Following Commands Follows one step commands with increased time or repetition   Activity Tolerance   Activity Tolerance Patient limited by fatigue;Patient limited by pain   Medical Staff Made Aware yes BEKAH Hameed   Assessment   Assessment Pt seen for OT treatment session. Pt sleeping upon OT's arrival requiring mod prompting to rouse and engage in session. Pt requiring min A and increased time to complete toileting tasks. Pt generally weak and deconditioned, limited by fatigue and pain as well as cognitive impairments. Returned to bed at end of session, bed alarm engaged and all needs in reach. The patient's raw score on the AM-PAC Daily Activity Inpatient Short Form is 19. A raw score of greater than or equal to 19 suggests the patient may benefit from discharge to home (return to [facility]). Please refer to the recommendation of the Occupational Therapist for safe discharge planning. Plan   Treatment Interventions ADL retraining;Functional transfer training;UE strengthening/ROM; Endurance training;Patient/family training;Equipment evaluation/education; Compensatory technique education; Activityengagement   OT Frequency 2-3x/wk   Discharge Recommendation   Rehab Resource Intensity Level, OT III (Minimum Resource Intensity)   AM-PAC Daily Activity Inpatient   Lower Body Dressing 3   Bathing 3   Toileting 3   Upper Body Dressing 3   Grooming 3   Eating 4   Daily Activity Raw Score 19   Daily Activity Standardized Score (Calc for Raw Score >=11) 40.22   AM-PAC Applied Cognition Inpatient   Following a Speech/Presentation 2   Understanding Ordinary Conversation 3   Taking Medications 2   Remembering Where Things Are Placed or Put Away 2   Remembering List of 4-5 Errands 1   Taking Care of Complicated Tasks 1   Applied Cognition Raw Score 11   Applied Cognition Standardized Score 27.03   End of Consult   Patient Position at End of Consult Supine;Bed/Chair alarm activated; All needs within reach   [facility] Insurance Number  Keisha Arnot [facility] OTR/L [id number]

## 2023-11-09 NOTE — PROGRESS NOTES
Patient keeps on getting OOB every 5 minutes. Bed alarmed went off. Unable to be redirected and unable to follow instruction. Patient unsteady, very aggressive and impulsive, pulling IV. Dr. Delmi Vaca made aware with new order to give IM Haldol 2mg X 1 dose. Will continue to monitor behavior. Maintain on safety and fall precaution.

## 2023-11-09 NOTE — PLAN OF CARE
Problem: CARDIOVASCULAR - ADULT  Goal: Maintains optimal cardiac output and hemodynamic stability  Description: INTERVENTIONS:  - Monitor I/O, vital signs and rhythm  - Monitor for S/S and trends of decreased cardiac output  - Administer and titrate ordered vasoactive medications to optimize hemodynamic stability  - Assess quality of pulses, skin color and temperature  - Assess for signs of decreased coronary artery perfusion  - Instruct patient to report change in severity of symptoms  Outcome: Progressing  Goal: Absence of cardiac dysrhythmias or at baseline rhythm  Description: INTERVENTIONS:  - Continuous cardiac monitoring, vital signs, obtain 12 lead EKG if ordered  - Administer antiarrhythmic and heart rate control medications as ordered  - Monitor electrolytes and administer replacement therapy as ordered  Outcome: Progressing     Problem: RESPIRATORY - ADULT  Goal: Achieves optimal ventilation and oxygenation  Description: INTERVENTIONS:  - Assess for changes in respiratory status  - Assess for changes in mentation and behavior  - Position to facilitate oxygenation and minimize respiratory effort  - Oxygen administered by appropriate delivery if ordered  - Initiate smoking cessation education as indicated  - Encourage broncho-pulmonary hygiene including cough, deep breathe, Incentive Spirometry  - Assess the need for suctioning and aspirate as needed  - Assess and instruct to report SOB or any respiratory difficulty  - Respiratory Therapy support as indicated  Outcome: Progressing     Problem: METABOLIC, FLUID AND ELECTROLYTES - ADULT  Goal: Electrolytes maintained within normal limits  Description: INTERVENTIONS:  - Monitor labs and assess patient for signs and symptoms of electrolyte imbalances  - Administer electrolyte replacement as ordered  - Monitor response to electrolyte replacements, including repeat lab results as appropriate  - Instruct patient on fluid and nutrition as appropriate  Outcome: Progressing  Goal: Fluid balance maintained  Description: INTERVENTIONS:  - Monitor labs   - Monitor I/O and WT  - Instruct patient on fluid and nutrition as appropriate  - Assess for signs & symptoms of volume excess or deficit  Outcome: Progressing  Goal: Glucose maintained within target range  Description: INTERVENTIONS:  - Monitor Blood Glucose as ordered  - Assess for signs and symptoms of hyperglycemia and hypoglycemia  - Administer ordered medications to maintain glucose within target range  - Assess nutritional intake and initiate nutrition service referral as needed  Outcome: Progressing     Problem: MOBILITY - ADULT  Goal: Maintain or return to baseline ADL function  Description: INTERVENTIONS:  -  Assess patient's ability to carry out ADLs; assess patient's baseline for ADL function and identify physical deficits which impact ability to perform ADLs (bathing, care of mouth/teeth, toileting, grooming, dressing, etc.)  - Assess/evaluate cause of self-care deficits   - Assess range of motion  - Assess patient's mobility; develop plan if impaired  - Assess patient's need for assistive devices and provide as appropriate  - Encourage maximum independence but intervene and supervise when necessary  - Involve family in performance of ADLs  - Assess for home care needs following discharge   - Consider OT consult to assist with ADL evaluation and planning for discharge  - Provide patient education as appropriate  Outcome: Progressing  Goal: Maintains/Returns to pre admission functional level  Description: INTERVENTIONS:  - Perform BMAT or MOVE assessment daily.   - Set and communicate daily mobility goal to care team and patient/family/caregiver.    - Collaborate with rehabilitation services on mobility goals if consulted  - Ambulate patient 3 times a day  - Out of bed for toileting  - Record patient progress and toleration of activity level   Outcome: Progressing

## 2023-11-09 NOTE — PROGRESS NOTES
Patient transferred from 3N per wheelchair accompanied by CNA. Oriented to the unit and to staffs. Placed on Masimo. Bed in lowest position. Bed alarmed. Instructed to use call bell for assistance. On airborne precaution due to  + Covid. Maintain on safety and fall precaution.

## 2023-11-10 PROCEDURE — 99232 SBSQ HOSP IP/OBS MODERATE 35: CPT | Performed by: INTERNAL MEDICINE

## 2023-11-10 RX ADMIN — HEPARIN SODIUM 5000 UNITS: 5000 INJECTION INTRAVENOUS; SUBCUTANEOUS at 05:51

## 2023-11-10 RX ADMIN — DONEPEZIL HYDROCHLORIDE 5 MG: 5 TABLET ORAL at 22:23

## 2023-11-10 RX ADMIN — HEPARIN SODIUM 5000 UNITS: 5000 INJECTION INTRAVENOUS; SUBCUTANEOUS at 14:24

## 2023-11-10 RX ADMIN — GUAIFENESIN 600 MG: 600 TABLET, EXTENDED RELEASE ORAL at 22:22

## 2023-11-10 RX ADMIN — DIVALPROEX SODIUM 250 MG: 250 TABLET, DELAYED RELEASE ORAL at 22:23

## 2023-11-10 RX ADMIN — DICYCLOMINE HYDROCHLORIDE 10 MG: 10 CAPSULE ORAL at 06:01

## 2023-11-10 RX ADMIN — HEPARIN SODIUM 5000 UNITS: 5000 INJECTION INTRAVENOUS; SUBCUTANEOUS at 22:23

## 2023-11-10 NOTE — PLAN OF CARE
Problem: CARDIOVASCULAR - ADULT  Goal: Maintains optimal cardiac output and hemodynamic stability  Description: INTERVENTIONS:  - Monitor I/O, vital signs and rhythm  - Monitor for S/S and trends of decreased cardiac output  - Administer and titrate ordered vasoactive medications to optimize hemodynamic stability  - Assess quality of pulses, skin color and temperature  - Assess for signs of decreased coronary artery perfusion  - Instruct patient to report change in severity of symptoms  Outcome: Progressing  Goal: Absence of cardiac dysrhythmias or at baseline rhythm  Description: INTERVENTIONS:  - Continuous cardiac monitoring, vital signs, obtain 12 lead EKG if ordered  - Administer antiarrhythmic and heart rate control medications as ordered  - Monitor electrolytes and administer replacement therapy as ordered  Outcome: Progressing     Problem: RESPIRATORY - ADULT  Goal: Achieves optimal ventilation and oxygenation  Description: INTERVENTIONS:  - Assess for changes in respiratory status  - Assess for changes in mentation and behavior  - Position to facilitate oxygenation and minimize respiratory effort  - Oxygen administered by appropriate delivery if ordered  - Initiate smoking cessation education as indicated  - Encourage broncho-pulmonary hygiene including cough, deep breathe, Incentive Spirometry  - Assess the need for suctioning and aspirate as needed  - Assess and instruct to report SOB or any respiratory difficulty  - Respiratory Therapy support as indicated  Outcome: Progressing     Problem: METABOLIC, FLUID AND ELECTROLYTES - ADULT  Goal: Electrolytes maintained within normal limits  Description: INTERVENTIONS:  - Monitor labs and assess patient for signs and symptoms of electrolyte imbalances  - Administer electrolyte replacement as ordered  - Monitor response to electrolyte replacements, including repeat lab results as appropriate  - Instruct patient on fluid and nutrition as appropriate  Outcome: Progressing  Goal: Fluid balance maintained  Description: INTERVENTIONS:  - Monitor labs   - Monitor I/O and WT  - Instruct patient on fluid and nutrition as appropriate  - Assess for signs & symptoms of volume excess or deficit  Outcome: Progressing  Goal: Glucose maintained within target range  Description: INTERVENTIONS:  - Monitor Blood Glucose as ordered  - Assess for signs and symptoms of hyperglycemia and hypoglycemia  - Administer ordered medications to maintain glucose within target range  - Assess nutritional intake and initiate nutrition service referral as needed  Outcome: Progressing     Problem: MOBILITY - ADULT  Goal: Maintain or return to baseline ADL function  Description: INTERVENTIONS:  -  Assess patient's ability to carry out ADLs; assess patient's baseline for ADL function and identify physical deficits which impact ability to perform ADLs (bathing, care of mouth/teeth, toileting, grooming, dressing, etc.)  - Assess/evaluate cause of self-care deficits   - Assess range of motion  - Assess patient's mobility; develop plan if impaired  - Assess patient's need for assistive devices and provide as appropriate  - Encourage maximum independence but intervene and supervise when necessary  - Involve family in performance of ADLs  - Assess for home care needs following discharge   - Consider OT consult to assist with ADL evaluation and planning for discharge  - Provide patient education as appropriate  Outcome: Progressing  Goal: Maintains/Returns to pre admission functional level  Description: INTERVENTIONS:  - Perform BMAT or MOVE assessment daily.   - Set and communicate daily mobility goal to care team and patient/family/caregiver. - Collaborate with rehabilitation services on mobility goals if consulted  - Perform Range of Motion 3 times a day. - Reposition patient every 2 hours.   - Dangle patient 3 times a day  - Stand patient 3 times a day  - Ambulate patient 3 times a day  - Out of bed to chair 3 times a day   - Out of bed for meals 3 times a day  - Out of bed for toileting  - Record patient progress and toleration of activity level   Outcome: Progressing     Problem: Prexisting or High Potential for Compromised Skin Integrity  Goal: Skin integrity is maintained or improved  Description: INTERVENTIONS:  - Identify patients at risk for skin breakdown  - Assess and monitor skin integrity  - Assess and monitor nutrition and hydration status  - Monitor labs   - Assess for incontinence   - Turn and reposition patient  - Assist with mobility/ambulation  - Relieve pressure over bony prominences  - Avoid friction and shearing  - Provide appropriate hygiene as needed including keeping skin clean and dry  - Evaluate need for skin moisturizer/barrier cream  - Collaborate with interdisciplinary team   - Patient/family teaching  - Consider wound care consult   Outcome: Progressing

## 2023-11-10 NOTE — PLAN OF CARE
Problem: CARDIOVASCULAR - ADULT  Goal: Maintains optimal cardiac output and hemodynamic stability  Description: INTERVENTIONS:  - Monitor I/O, vital signs and rhythm  - Monitor for S/S and trends of decreased cardiac output  - Administer and titrate ordered vasoactive medications to optimize hemodynamic stability  - Assess quality of pulses, skin color and temperature  - Assess for signs of decreased coronary artery perfusion  - Instruct patient to report change in severity of symptoms  Outcome: Progressing  Goal: Absence of cardiac dysrhythmias or at baseline rhythm  Description: INTERVENTIONS:  - Continuous cardiac monitoring, vital signs, obtain 12 lead EKG if ordered  - Administer antiarrhythmic and heart rate control medications as ordered  - Monitor electrolytes and administer replacement therapy as ordered  Outcome: Progressing     Problem: RESPIRATORY - ADULT  Goal: Achieves optimal ventilation and oxygenation  Description: INTERVENTIONS:  - Assess for changes in respiratory status  - Assess for changes in mentation and behavior  - Position to facilitate oxygenation and minimize respiratory effort  - Oxygen administered by appropriate delivery if ordered  - Initiate smoking cessation education as indicated  - Encourage broncho-pulmonary hygiene including cough, deep breathe, Incentive Spirometry  - Assess the need for suctioning and aspirate as needed  - Assess and instruct to report SOB or any respiratory difficulty  - Respiratory Therapy support as indicated  Outcome: Progressing     Problem: METABOLIC, FLUID AND ELECTROLYTES - ADULT  Goal: Electrolytes maintained within normal limits  Description: INTERVENTIONS:  - Monitor labs and assess patient for signs and symptoms of electrolyte imbalances  - Administer electrolyte replacement as ordered  - Monitor response to electrolyte replacements, including repeat lab results as appropriate  - Instruct patient on fluid and nutrition as appropriate  Outcome: Progressing  Goal: Fluid balance maintained  Description: INTERVENTIONS:  - Monitor labs   - Monitor I/O and WT  - Instruct patient on fluid and nutrition as appropriate  - Assess for signs & symptoms of volume excess or deficit  Outcome: Progressing  Goal: Glucose maintained within target range  Description: INTERVENTIONS:  - Monitor Blood Glucose as ordered  - Assess for signs and symptoms of hyperglycemia and hypoglycemia  - Administer ordered medications to maintain glucose within target range  - Assess nutritional intake and initiate nutrition service referral as needed  Outcome: Progressing     Problem: MOBILITY - ADULT  Goal: Maintain or return to baseline ADL function  Description: INTERVENTIONS:  -  Assess patient's ability to carry out ADLs; assess patient's baseline for ADL function and identify physical deficits which impact ability to perform ADLs (bathing, care of mouth/teeth, toileting, grooming, dressing, etc.)  - Assess/evaluate cause of self-care deficits   - Assess range of motion  - Assess patient's mobility; develop plan if impaired  - Assess patient's need for assistive devices and provide as appropriate  - Encourage maximum independence but intervene and supervise when necessary  - Involve family in performance of ADLs  - Assess for home care needs following discharge   - Consider OT consult to assist with ADL evaluation and planning for discharge  - Provide patient education as appropriate  Outcome: Progressing  Goal: Maintains/Returns to pre admission functional level  Description: INTERVENTIONS:  - Perform BMAT or MOVE assessment daily.   - Set and communicate daily mobility goal to care team and patient/family/caregiver. - Collaborate with rehabilitation services on mobility goals if consulted  - Perform Range of Motion 3 times a day. - Reposition patient every 2 hours.   - Dangle patient 3 times a day  - Stand patient 3 times a day  - Ambulate patient 3 times a day  - Out of bed to chair 3 times a day   - Out of bed for meals 3 times a day  - Out of bed for toileting  - Record patient progress and toleration of activity level   Outcome: Progressing

## 2023-11-10 NOTE — CASE MANAGEMENT
Case Management Discharge Planning Note    Patient name Maria Teresa Paz  Location [address] MRN [id number]  : 1935 Date 11/10/2023       Current Admission Date: 2023  Current Admission Diagnosis:Acute encephalopathy   Patient Active Problem List    Diagnosis Date Noted    Thrombocytopenia ([address]) 2023    Acute kidney injury superimposed on CKD  2023    COVID-19 2023    Acute encephalopathy 2023    Dementia ([address]) 2023    Microscopic hematuria 2023    Elevated brain natriuretic peptide (BNP) level 2023    Elevated troponin 2023    Pacemaker 2023    MI (myocardial infarction) ([address]) 2023    Second degree heart block by electrocardiogram (ECG) 2023    Bradycardia 2023    History of heart attack 2023    Resistant hypertension 2023    Essential hypertension 2023    Hyperlipidemia 2023    History of gout 2023    Moderate dementia without behavioral disturbance, psychotic disturbance, mood disturbance, or anxiety ([address]) 2023    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 Fax: [phone]    Primary Care Provider: No primary care provider on file.     Primary Insurance: MEDICARE  Secondary Insurance: BLUE CROSS    DISCHARGE DETAILS:    Discharge planning discussed with[de-identified] Daughter Chely Butler and [facility] at [address] of Choice: Yes    Comments - Freedom of Choice: SW spoke with Ayaz Cook in nursing at [facility] [place] who clarified that [facility] is willing to accept patient back but needs time to make arrangements for patient and his wife to have separate rooms within the facility due to behavioral concerns. SW spoke with daughter Chely Butler by phone and offered to conference [facility] into the conversation but she declined. Questions were answered about referrals for STR and Chely Butler confirmed that her sister Brittani Brito plans to [facility] and [facility] today. SW spoke with Chely Butler later in the day and she expressed preference for [facility]. Facility was reserved [address] and SW requested S transport for tomorrow. [facility] was then notified by Nedra Roy that the bed was given away this afternoon and that facility should be contacted on Sunday to see if a bed has opened up. SW notified alfonso Butler by phone. SW also spoke with [facility] liaison Georgiana Norris about concerns raised by the facility about patient's behavior. Patient is not on 1:1 observation or virtual observation but Georgiana Norris stated that the facility raised concerns about nursing notes that reference patient being agitated overnight. Facility will continue to follow. [facility] was also approached per family's request for a facility in [facility] but does not have a bed available until early next week. Attending notified and Esequiel Licona will continue to follow. CM contacted family/caregiver?: Yes  Were Treatment Team discharge recommendations reviewed with patient/caregiver?: Yes  Did patient/caregiver verbalize understanding of patient care needs?: N/A- going to facility  Were patient/caregiver advised of the risks associated with not following Treatment Team discharge recommendations?: Yes    Contacts  Patient Contacts:  Mariluz Aguilar (daughter)  Relationship to Patient[de-identified] Family  Contact Method: Phone  Phone Number: [phone]  Reason/Outcome: Emergency Contact, Discharge [address]         Is the patient interested in [address] [facility] at discharge?: No    DME Referral Provided  Referral made for DME?: No    Other Referral/Resources/Interventions Provided:  Interventions: Short Term Rehab    Would you like to participate in our [address] [facility] service program?  : No - Declined    Treatment Team Recommendation: Short Term Rehab  Discharge Destination Plan[de-identified] Short Term Rehab  Transport at Discharge : BLS Ambulance         IMM Given (Date):: 11/10/23  IMM Given to[de-identified] Family (IMM reviewed with daughter Garth Everett by phone and she verbalized acknowledgement.   Copy placed in label book for inclusion in pt's discharge paperwork per daughter's request.  Copy also placed in scan bin for chart.)

## 2023-11-10 NOTE — PROGRESS NOTES
[address] [facility]  Progress Note  Name: Arianna Camarillo  MRN: [id number]  Unit/Bed#: 913 [facility] 403-01 I Date of Admission: 11/6/2023   Date of Service: 11/10/2023 I Hospital Day: 3    Assessment/Plan   * Acute encephalopathy  Assessment & Plan  Patient with baseline dementia but per family is generally cooperative, mild-mannered. Lives at assisted living facility, was sent to the ER after he became combative agitated combative and agitated. Diagnosed with COVID prior to arrival on 11/4    Encephalopathy appears to be secondary to his COVID-19 infection  Status post 3 days of IV remdesivir  Patient also has some new changes on echocardiogram with a mild drop in EF and some elevated troponin. Not felt to be acute cardiac event per cardiology. He is being optimized with addition of low-dose carvedilol. Has had some intermittent episodes of agitation and impulsiveness, especially at night. Elevated troponin  Assessment & Plan  Troponin around 950 on admission but was asymptomatic from a cardiac standpoint  Not felt to be an acute cardiac event per cardiology  Being optimized with addition of carvedilol    COVID-19  Assessment & Plan  Initially diagnosed on 11/4 at assisted living facility  Patient continues to be asymptomatic from respiratory standpoint and stable on room air. Encephalopathy is improving  Completed 3 days of IV remdesivir as patient is high risk given his dementia, comorbidities, and age. Contain contact and airborne precautions. Acute kidney injury superimposed on CKD   Assessment & Plan  Prerenal etiology in the setting of COVID-19 infection and decreased p.o. intake  Not in and baseline appears to be around 1.4; now stable around 1.5  Continue to monitor    Dementia [facility])  Assessment & Plan  History of dementia but is typically mild-mannered and well behaved at the assisted living facility where he resides  Became combative and agitated prompting his visit to the ER. Also has COVID. Seen by psychiatry while inpatient; no new changes recommended, they recommended outpatient follow-up with psychiatry. Family working on transitioning patient to a longer term care facility. Will likely need rehab on discharge  Continue home Depakote and Aricept      Pacemaker  Assessment & Plan  Noted history. History of gout  Assessment & Plan  Continue allopurinol    Hyperlipidemia  Assessment & Plan  Continue low-dose statin    Essential hypertension  Assessment & Plan  BP has been labile while hospitalized  We will hold home losartan for now  Continue carvedilol        VTE Pharmacologic Prophylaxis: VTE Score: 5 High Risk (Score >/= 5) - Pharmacological DVT Prophylaxis Ordered: heparin. Sequential Compression Devices Ordered. Patient Centered Rounds: I performed bedside rounds with nursing staff today. Discussions with Specialists or Other Care Team Provider: MAYTE.     Education and Discussions with Family / Patient: Updated  (daughter) via phone. Total Time Spent on Date of Encounter in care of patient: 35 mins. This time was spent on one or more of the following: performing physical exam; counseling and coordination of care; obtaining or reviewing history; documenting in the medical record; reviewing/ordering tests, medications or procedures; communicating with other healthcare professionals and discussing with patient's family/caregivers. Current Length of Stay: 3 day(s)  Current Patient Status: Inpatient   Certification Statement: The patient will continue to require additional inpatient hospital stay due to disposition planning, clinical monitoring  Discharge Plan: Anticipate discharge in 24-48 hrs to rehab facility. Code Status: Level 1 - Full Code    Subjective:   Patient seen and examined. Patient resting comfortably in bed this morning. More drowsy. Per nursing not eating or drinking today. Did get a dose of Seroquel last evening.     Objective: Vitals:   Temp (24hrs), Av.9 °F (36.6 °C), Min:97.1 °F (36.2 °C), Max:98.4 °F (36.9 °C)    Temp:  [97.1 °F (36.2 °C)-98.4 °F (36.9 °C)] 98.4 °F (36.9 °C)  HR:  [69-71] 71  Resp:  [18] 18  BP: ()/() 120/65  SpO2:  [96 %-99 %] 98 %  Body mass index is 22.31 kg/m². Input and Output Summary (last 24 hours):   No intake or output data in the 24 hours ending 11/10/23 1306      PHYSICAL EXAM:    Vitals signs reviewed  Constitutional   Awake and cooperative. NAD. Head/Neck   Normocephalic. Atraumatic. HEENT   No scleral icterus. EOMI. Heart   Regular rate and rhythm. No murmers. Lungs   Clear to auscultation bilaterally. Respirations unlaboured. Abdomen   Soft. Nontender. Nondistended. Skin   Skin color normal. No rashes. Extremities   No deformities. No peripheral edema. Neuro   Alert and oriented. No new deficits. Psych   Mood stable. Affect flat. Additional Data:     Labs:  Results from last 7 days   Lab Units 23   WBC Thousand/uL 4.79 3.76*   HEMOGLOBIN g/dL 12.4 12.4   HEMATOCRIT % 37.7 37.6   PLATELETS Thousands/uL 123* 107*   NEUTROS PCT %  --  60   LYMPHS PCT %  --  25   MONOS PCT %  --  14*   EOS PCT %  --  0     Results from last 7 days   Lab Units 23  0554   SODIUM mmol/L 141   < > 138   POTASSIUM mmol/L 4.3   < > 4.0   CHLORIDE mmol/L 112*   < > 108   CO2 mmol/L 20*   < > 21   BUN mg/dL 36*   < > 38*   CREATININE mg/dL 1.51*   < > 1.78*   ANION GAP mmol/L 9   < > 9   CALCIUM mg/dL 8.2*   < > 7.5*   ALBUMIN g/dL  --   --  3.2*   TOTAL BILIRUBIN mg/dL  --   --  0.35   ALK PHOS U/L  --   --  50   ALT U/L  --   --  22   AST U/L  --   --  42*   GLUCOSE RANDOM mg/dL 80   < > 81    < > = values in this interval not displayed.                  Results from last 7 days   Lab Units 23  1414   PROCALCITONIN ng/ml 1.27*       Lines/Drains:  Invasive Devices       Peripheral Intravenous Line  Duration Peripheral IV 11/06/23 Right Antecubital 4 days                          Imaging: Reviewed radiology reports from this admission including: ECHO    Recent Cultures (last 7 days):         Last 24 Hours Medication List:   Current Facility-Administered Medications   Medication Dose Route Frequency Provider Last Rate    acetaminophen  650 mg Oral Q6H PRN SHERRY Rolon      allopurinol  100 mg Oral Daily SHERRY Rolon      carvedilol  3.125 mg Oral BID With Meals Deep Ansari PA-C      vitamin B-12  1,000 mcg Oral Daily CuSHERRY Little      dicyclomine  10 mg Oral BID AC SHERRY Rolon      divalproex sodium  250 mg Oral Q12H [facility] SHERRY Rolon      donepezil  5 mg Oral HS SHERRY Rolon      guaiFENesin  600 mg Oral Q12H [facility] SHERRY Rolon      heparin (porcine)  5,000 Units Subcutaneous Q8H [facility] SHERRY Rolon      melatonin  3 mg Oral HS Rishi Castañeda MD      ondansetron  4 mg Intravenous Q6H PRN SHERRY Rolon      pravastatin  20 mg Oral Daily With Martin & SHERRY Haley          Today, Patient Was Seen By: Eloisa Gray DO    **Please Note: This note may have been constructed using a voice recognition system. **

## 2023-11-10 NOTE — PHYSICAL THERAPY NOTE
Attempted PT treatment however pt just started eating as he has been sleeping all day. Will continue to follow.   Amarilis Due PT  [id number]     11/10/23 1501   Note Type   Note Type Cancelled Session   Cancel Reasons Other

## 2023-11-11 LAB
ANION GAP SERPL CALCULATED.3IONS-SCNC: 8 MMOL/L
BUN SERPL-MCNC: 28 MG/DL (ref 5–25)
CALCIUM SERPL-MCNC: 8.3 MG/DL (ref 8.4–10.2)
CHLORIDE SERPL-SCNC: 112 MMOL/L (ref 96–108)
CO2 SERPL-SCNC: 20 MMOL/L (ref 21–32)
CREAT SERPL-MCNC: 1.28 MG/DL (ref 0.6–1.3)
ERYTHROCYTE [DISTWIDTH] IN BLOOD BY AUTOMATED COUNT: 13.4 % (ref 11.6–15.1)
GFR SERPL CREATININE-BSD FRML MDRD: 49 ML/MIN/1.73SQ M
GLUCOSE SERPL-MCNC: 98 MG/DL (ref 65–140)
HCT VFR BLD AUTO: 38.6 % (ref 36.5–49.3)
HGB BLD-MCNC: 13 G/DL (ref 12–17)
MCH RBC QN AUTO: 32.1 PG (ref 26.8–34.3)
MCHC RBC AUTO-ENTMCNC: 33.7 G/DL (ref 31.4–37.4)
MCV RBC AUTO: 95 FL (ref 82–98)
PLATELET # BLD AUTO: 163 THOUSANDS/UL (ref 149–390)
PMV BLD AUTO: 12.5 FL (ref 8.9–12.7)
POTASSIUM SERPL-SCNC: 3.9 MMOL/L (ref 3.5–5.3)
RBC # BLD AUTO: 4.05 MILLION/UL (ref 3.88–5.62)
SODIUM SERPL-SCNC: 140 MMOL/L (ref 135–147)
WBC # BLD AUTO: 9.25 THOUSAND/UL (ref 4.31–10.16)

## 2023-11-11 PROCEDURE — 85027 COMPLETE CBC AUTOMATED: CPT | Performed by: INTERNAL MEDICINE

## 2023-11-11 PROCEDURE — 99232 SBSQ HOSP IP/OBS MODERATE 35: CPT | Performed by: INTERNAL MEDICINE

## 2023-11-11 PROCEDURE — 80048 BASIC METABOLIC PNL TOTAL CA: CPT | Performed by: INTERNAL MEDICINE

## 2023-11-11 RX ORDER — DIVALPROEX SODIUM 125 MG/1
250 CAPSULE, COATED PELLETS ORAL EVERY 12 HOURS SCHEDULED
Status: DISCONTINUED | OUTPATIENT
Start: 2023-11-11 | End: 2023-11-13 | Stop reason: HOSPADM

## 2023-11-11 RX ORDER — GUAIFENESIN 100 MG/5ML
200 SOLUTION ORAL EVERY 4 HOURS PRN
Status: DISCONTINUED | OUTPATIENT
Start: 2023-11-11 | End: 2023-11-13 | Stop reason: HOSPADM

## 2023-11-11 RX ADMIN — HEPARIN SODIUM 5000 UNITS: 5000 INJECTION INTRAVENOUS; SUBCUTANEOUS at 13:32

## 2023-11-11 RX ADMIN — DICYCLOMINE HYDROCHLORIDE 10 MG: 10 CAPSULE ORAL at 15:42

## 2023-11-11 RX ADMIN — PRAVASTATIN SODIUM 20 MG: 20 TABLET ORAL at 15:42

## 2023-11-11 RX ADMIN — DICYCLOMINE HYDROCHLORIDE 10 MG: 10 CAPSULE ORAL at 06:00

## 2023-11-11 RX ADMIN — CARVEDILOL 3.12 MG: 3.12 TABLET, FILM COATED ORAL at 15:42

## 2023-11-11 RX ADMIN — HEPARIN SODIUM 5000 UNITS: 5000 INJECTION INTRAVENOUS; SUBCUTANEOUS at 05:57

## 2023-11-11 RX ADMIN — DIVALPROEX SODIUM 250 MG: 125 CAPSULE, COATED PELLETS ORAL at 21:39

## 2023-11-11 RX ADMIN — CYANOCOBALAMIN TAB 500 MCG 1000 MCG: 500 TAB at 09:15

## 2023-11-11 RX ADMIN — DONEPEZIL HYDROCHLORIDE 5 MG: 5 TABLET ORAL at 21:39

## 2023-11-11 RX ADMIN — HEPARIN SODIUM 5000 UNITS: 5000 INJECTION INTRAVENOUS; SUBCUTANEOUS at 21:39

## 2023-11-11 RX ADMIN — DIVALPROEX SODIUM 250 MG: 125 CAPSULE, COATED PELLETS ORAL at 09:15

## 2023-11-11 RX ADMIN — CARVEDILOL 3.12 MG: 3.12 TABLET, FILM COATED ORAL at 09:15

## 2023-11-11 RX ADMIN — ALLOPURINOL 100 MG: 100 TABLET ORAL at 09:15

## 2023-11-11 NOTE — PROGRESS NOTES
[address] [facility]  Progress Note  Name: Greg Galicia  MRN: [id number]  Unit/Bed#: 913 [facility] 403-01 I Date of Admission: 11/6/2023   Date of Service: 11/11/2023 I Hospital Day: 4    Assessment/Plan   * Acute encephalopathy  Assessment & Plan  Patient with baseline dementia but per family is generally cooperative, mild-mannered. Lives at assisted living facility, was sent to the ER after he became combative agitated combative and agitated. Diagnosed with COVID prior to arrival on 11/4    Encephalopathy appears to be secondary to his COVID-19 infection  Status post 3 days of IV remdesivir  Patient also has some new changes on echocardiogram with a mild drop in EF and some elevated troponin. Not felt to be acute cardiac event per cardiology. He is being optimized with addition of low-dose carvedilol. Has had some intermittent episodes of agitation and impulsiveness, but overall he has been cooperative. Elevated troponin  Assessment & Plan  Troponin around 950 on admission but was asymptomatic from a cardiac standpoint  Not felt to be an acute cardiac event per cardiology  Mild drop in EF on repeat echocardiogram  Appreciate cardiology recommendations; being optimized with addition of carvedilol    COVID-19  Assessment & Plan  Initially diagnosed on 11/4 at assisted living facility  Patient continues to be asymptomatic from respiratory standpoint and stable on room air. Encephalopathy is improving  Completed 3 days of IV remdesivir as patient is high risk given his dementia, comorbidities, and age. Contain contact and airborne precautions.     Acute kidney injury superimposed on CKD   Assessment & Plan  Prerenal etiology in the setting of COVID-19 infection and decreased p.o. intake  Now resolved with IV fluid resuscitation    Thrombocytopenia ([address])  Assessment & Plan  Transient thrombocytopenia in the setting of COVID infection  Now resolved    Dementia [facility])  Assessment & Plan  History of dementia but is typically mild-mannered and well behaved at the assisted living facility where he resides  Became combative and agitated prompting his visit to the ER. Also has COVID. Seen by psychiatry while inpatient; no new changes recommended, they recommended outpatient follow-up with psychiatry. Family working on transitioning patient to a longer term care facility. Will likely need rehab on discharge  Continue home Depakote and Aricept      Pacemaker  Assessment & Plan  Noted history. History of gout  Assessment & Plan  Continue allopurinol    Hyperlipidemia  Assessment & Plan  Continue low-dose statin    Essential hypertension  Assessment & Plan  BP has been labile while hospitalized  We will hold home losartan for now  Continue carvedilol        VTE Pharmacologic Prophylaxis: VTE Score: 5 High Risk (Score >/= 5) - Pharmacological DVT Prophylaxis Ordered: heparin. Sequential Compression Devices Ordered. Patient Centered Rounds: I performed bedside rounds with nursing staff today. Discussions with Specialists or Other Care Team Provider: MAYTE.     Education and Discussions with Family / Patient: Updated  (daughter) via phone. Total Time Spent on Date of Encounter in care of patient: 35 mins. This time was spent on one or more of the following: performing physical exam; counseling and coordination of care; obtaining or reviewing history; documenting in the medical record; reviewing/ordering tests, medications or procedures; communicating with other healthcare professionals and discussing with patient's family/caregivers. Current Length of Stay: 4 day(s)  Current Patient Status: Inpatient   Certification Statement: The patient will continue to require additional inpatient hospital stay due to disposition planning, clinical monitoring  Discharge Plan: Anticipate discharge in 24-48 hrs to rehab facility. Code Status: Level 1 - Full Code    Subjective:   Patient seen and examined. No new events overnight. Patient cooperative this morning. No complaints. Objective:     Vitals:   Temp (24hrs), Av.4 °F (36.9 °C), Min:97.6 °F (36.4 °C), Max:99.2 °F (37.3 °C)    Temp:  [97.6 °F (36.4 °C)-99.2 °F (37.3 °C)] 97.6 °F (36.4 °C)  HR:  [70] 70  Resp:  [17-20] 17  BP: (111-148)/(59-97) 111/59  SpO2:  [97 %-98 %] 98 %  Body mass index is 22.31 kg/m². Input and Output Summary (last 24 hours):   No intake or output data in the 24 hours ending 23 09      PHYSICAL EXAM:    Vitals signs reviewed  Constitutional   Awake and cooperative. NAD. Head/Neck   Normocephalic. Atraumatic. HEENT   No scleral icterus. EOMI. Heart   Regular rate and rhythm. No murmers. Lungs   Clear to auscultation bilaterally. Respirations unlaboured. Abdomen   Soft. Nontender. Nondistended. Skin   Skin color normal. No rashes. Extremities   No deformities. No peripheral edema. Neuro   Alert and oriented. No new deficits. Psych   Mood stable. Affect flat. Additional Data:     Labs:  Results from last 7 days   Lab Units 238 23   WBC Thousand/uL 9.25   < > 3.76*   HEMOGLOBIN g/dL 13.0   < > 12.4   HEMATOCRIT % 38.6   < > 37.6   PLATELETS Thousands/uL 163   < > 107*   NEUTROS PCT %  --   --  60   LYMPHS PCT %  --   --  25   MONOS PCT %  --   --  14*   EOS PCT %  --   --  0    < > = values in this interval not displayed.      Results from last 7 days   Lab Units 23  0554   SODIUM mmol/L 140   < > 138   POTASSIUM mmol/L 3.9   < > 4.0   CHLORIDE mmol/L 112*   < > 108   CO2 mmol/L 20*   < > 21   BUN mg/dL 28*   < > 38*   CREATININE mg/dL 1.28   < > 1.78*   ANION GAP mmol/L 8   < > 9   CALCIUM mg/dL 8.3*   < > 7.5*   ALBUMIN g/dL  --   --  3.2*   TOTAL BILIRUBIN mg/dL  --   --  0.35   ALK PHOS U/L  --   --  50   ALT U/L  --   --  22   AST U/L  --   --  42*   GLUCOSE RANDOM mg/dL 98   < > 81    < > = values in this interval not displayed. Results from last 7 days   Lab Units 11/07/23  1414   PROCALCITONIN ng/ml 1.27*       Lines/Drains:  Invasive Devices       Peripheral Intravenous Line  Duration             Peripheral IV 11/06/23 Right Antecubital 5 days                          Imaging: Reviewed radiology reports from this admission including: ECHO    Recent Cultures (last 7 days):         Last 24 Hours Medication List:   Current Facility-Administered Medications   Medication Dose Route Frequency Provider Last Rate    acetaminophen  650 mg Oral Q6H PRN SHERRY Rolon      allopurinol  100 mg Oral Daily SHERRY Rolon      carvedilol  3.125 mg Oral BID With Meals Rebecca Bowles PA-C      vitamin B-12  1,000 mcg Oral Daily SHERRY Rolon      dicyclomine  10 mg Oral BID AC SHERRY Rolon      divalproex sodium  250 mg Oral Q12H [address] [facility] DO Isaac      donepezil  5 mg Oral HS SHERRY Rolon      guaiFENesin  200 mg Oral Q4H PRN Lyndsay Gray DO      heparin (porcine)  5,000 Units Subcutaneous Q8H [facility] SHERRY Rolon      ondansetron  4 mg Intravenous Q6H PRN SHERRY Rolon      pravastatin  20 mg Oral Daily With Martin & SHERRY Haley          Today, Patient Was Seen By: Lyndsay Gray DO    **Please Note: This note may have been constructed using a voice recognition system. **

## 2023-11-12 ENCOUNTER — APPOINTMENT (INPATIENT)
Dept: RADIOLOGY | Facility: HOSPITAL | Age: 88
DRG: 178 | End: 2023-11-12
Payer: MEDICARE

## 2023-11-12 PROBLEM — M25.421 ELBOW SWELLING, RIGHT: Status: ACTIVE | Noted: 2023-11-12

## 2023-11-12 LAB
ANION GAP SERPL CALCULATED.3IONS-SCNC: 7 MMOL/L
BUN SERPL-MCNC: 29 MG/DL (ref 5–25)
CALCIUM SERPL-MCNC: 8.4 MG/DL (ref 8.4–10.2)
CHLORIDE SERPL-SCNC: 110 MMOL/L (ref 96–108)
CO2 SERPL-SCNC: 23 MMOL/L (ref 21–32)
CREAT SERPL-MCNC: 1.32 MG/DL (ref 0.6–1.3)
CRP SERPL QL: 168.6 MG/L
ERYTHROCYTE [DISTWIDTH] IN BLOOD BY AUTOMATED COUNT: 13.5 % (ref 11.6–15.1)
ERYTHROCYTE [SEDIMENTATION RATE] IN BLOOD: 34 MM/HOUR (ref 0–19)
GFR SERPL CREATININE-BSD FRML MDRD: 47 ML/MIN/1.73SQ M
GLUCOSE SERPL-MCNC: 127 MG/DL (ref 65–140)
HCT VFR BLD AUTO: 40.2 % (ref 36.5–49.3)
HGB BLD-MCNC: 13.5 G/DL (ref 12–17)
MCH RBC QN AUTO: 32.1 PG (ref 26.8–34.3)
MCHC RBC AUTO-ENTMCNC: 33.6 G/DL (ref 31.4–37.4)
MCV RBC AUTO: 96 FL (ref 82–98)
PLATELET # BLD AUTO: 194 THOUSANDS/UL (ref 149–390)
PMV BLD AUTO: 12.3 FL (ref 8.9–12.7)
POTASSIUM SERPL-SCNC: 4.1 MMOL/L (ref 3.5–5.3)
RBC # BLD AUTO: 4.2 MILLION/UL (ref 3.88–5.62)
SODIUM SERPL-SCNC: 140 MMOL/L (ref 135–147)
URATE SERPL-MCNC: 8.3 MG/DL (ref 3.5–8.5)
WBC # BLD AUTO: 7.09 THOUSAND/UL (ref 4.31–10.16)

## 2023-11-12 PROCEDURE — 84550 ASSAY OF BLOOD/URIC ACID: CPT | Performed by: INTERNAL MEDICINE

## 2023-11-12 PROCEDURE — 80048 BASIC METABOLIC PNL TOTAL CA: CPT | Performed by: INTERNAL MEDICINE

## 2023-11-12 PROCEDURE — 73070 X-RAY EXAM OF ELBOW: CPT

## 2023-11-12 PROCEDURE — 86140 C-REACTIVE PROTEIN: CPT | Performed by: ORTHOPAEDIC SURGERY

## 2023-11-12 PROCEDURE — 97530 THERAPEUTIC ACTIVITIES: CPT

## 2023-11-12 PROCEDURE — 85027 COMPLETE CBC AUTOMATED: CPT | Performed by: INTERNAL MEDICINE

## 2023-11-12 PROCEDURE — 99222 1ST HOSP IP/OBS MODERATE 55: CPT | Performed by: ORTHOPAEDIC SURGERY

## 2023-11-12 PROCEDURE — 99233 SBSQ HOSP IP/OBS HIGH 50: CPT | Performed by: INTERNAL MEDICINE

## 2023-11-12 PROCEDURE — 85652 RBC SED RATE AUTOMATED: CPT | Performed by: ORTHOPAEDIC SURGERY

## 2023-11-12 RX ADMIN — ALLOPURINOL 100 MG: 100 TABLET ORAL at 09:01

## 2023-11-12 RX ADMIN — HEPARIN SODIUM 5000 UNITS: 5000 INJECTION INTRAVENOUS; SUBCUTANEOUS at 14:00

## 2023-11-12 RX ADMIN — HEPARIN SODIUM 5000 UNITS: 5000 INJECTION INTRAVENOUS; SUBCUTANEOUS at 21:16

## 2023-11-12 RX ADMIN — DONEPEZIL HYDROCHLORIDE 5 MG: 5 TABLET ORAL at 21:16

## 2023-11-12 RX ADMIN — VANCOMYCIN HYDROCHLORIDE 1500 MG: 10 INJECTION, POWDER, LYOPHILIZED, FOR SOLUTION INTRAVENOUS at 10:50

## 2023-11-12 RX ADMIN — DIVALPROEX SODIUM 250 MG: 125 CAPSULE, COATED PELLETS ORAL at 21:16

## 2023-11-12 RX ADMIN — HEPARIN SODIUM 5000 UNITS: 5000 INJECTION INTRAVENOUS; SUBCUTANEOUS at 06:32

## 2023-11-12 RX ADMIN — CYANOCOBALAMIN TAB 500 MCG 1000 MCG: 500 TAB at 09:01

## 2023-11-12 RX ADMIN — CARVEDILOL 3.12 MG: 3.12 TABLET, FILM COATED ORAL at 17:18

## 2023-11-12 RX ADMIN — DICYCLOMINE HYDROCHLORIDE 10 MG: 10 CAPSULE ORAL at 17:18

## 2023-11-12 RX ADMIN — PRAVASTATIN SODIUM 20 MG: 20 TABLET ORAL at 17:18

## 2023-11-12 RX ADMIN — DICYCLOMINE HYDROCHLORIDE 10 MG: 10 CAPSULE ORAL at 06:32

## 2023-11-12 RX ADMIN — CARVEDILOL 3.12 MG: 3.12 TABLET, FILM COATED ORAL at 09:01

## 2023-11-12 RX ADMIN — DIVALPROEX SODIUM 250 MG: 125 CAPSULE, COATED PELLETS ORAL at 09:00

## 2023-11-12 NOTE — CONSULTS
Consultation - Orthopedics   Mercy Fried 80 y.o. male MRN: [id number]  Unit/Bed#: [address] [place] [address] Encounter: [id number]      Assessment/Plan     Assessment:  1) R olecranon bursitis RUE septic vs gout in etiology  2) Right elbow pain    Plan:  On examination today the patient states his elbow does not hurt at rest nor does it hurt during a small arc of motion but only at extreme of flexion posteriorly on the elbow. He does have a history of gout, and reports chronic swelling in this area. On exam today he does have a very small bursal collection at the right olecranon. There is minimal erythema. The patient is currently afebrile, without leukocytosis, without pain in his right elbow joint on arc of motion therefore I have no suspicion for septic arthritis of the elbow. With his history of gout, normal WBC, afebrile status, and a very minimal collection about the elbow I recommend avoiding aspiration and continue to observe on antibiotics plus or minus an anti-inflammatory. We will defer the decision to initiate anti-inflammatory regiment to the medical team.    May continue antibiotics however origin of bursitis likely gout > septic based off clinical exam, and lack of systemic response to suggests septic origin  We will order ESR/CRP for further data  Warm compress right elbow  Analgesics as needed  Hold on olecranon aspiration at current time  Plus or minus anti-inflammatories as above, will defer decision to SLIM  We will follow for clinical resolution    History of Present Illness   Physician Requesting Consult: Delma Trotter, *  Reason for Consult / Principal Problem: Right elbow pain  HPI: Jovanna Munoz is a 80y.o. year old male who presents with encephalopathy,COVID-19, ANN MARIE and elevated troponins also complains of right elbow pain. On exam today the patient states that pain and swelling behind his elbow has been there for quite a while.   He states that it is not painful unless it is "messed with".  He denies pain in his wrist or shoulder. He does report a history of gout. He denies trauma to the right elbow. He states that the elbow is mildly painful with deep flexion of the elbow. Inpatient consult to Orthopedic Surgery  Consult performed by: Kiki Reilly DO  Consult ordered by: Kiley Gray DO          Review of Systems   Constitutional:  Positive for activity change. HENT: Negative. Eyes:  Positive for discharge. Respiratory:  Positive for shortness of breath. Cardiovascular:  Negative for chest pain and leg swelling. Gastrointestinal: Negative. Musculoskeletal:  Positive for arthralgias and joint swelling. Skin:  Negative for color change, pallor and rash. Neurological:  Negative for dizziness, syncope and headaches. Historical Information   Past Medical History:   Diagnosis Date    Alzheimer disease ([address])     CAD (coronary artery disease)     GERD (gastroesophageal reflux disease)     Gout     History of heart attack     HLD (hyperlipidemia)     Hypertension      Past Surgical History:   Procedure Laterality Date    CARDIAC ELECTROPHYSIOLOGY PROCEDURE N/A 2023    Procedure: Cardiac pacer implant;  Surgeon: Chris Tenorio MD;  Location: [address] [place] [address] CATH LAB;   Service: Cardiology    HIATAL HERNIA REPAIR       Social History   Social History     Substance and Sexual Activity   Alcohol Use Not Currently     Social History     Substance and Sexual Activity   Drug Use Never     E-Cigarette/Vaping    E-Cigarette Use Never User      E-Cigarette/Vaping Substances    Nicotine No     THC No     CBD No     Flavoring No     Other No     Unknown No      Social History     Tobacco Use   Smoking Status Former    Packs/day: 1.00    Types: Cigarettes    Quit date:     Years since quittin.8   Smokeless Tobacco Never     Family History: non-contributory    Meds/Allergies   current meds:   Current Facility-Administered Medications   Medication Dose Route Frequency    acetaminophen (TYLENOL) tablet 650 mg  650 mg Oral Q6H PRN    allopurinol (ZYLOPRIM) tablet 100 mg  100 mg Oral Daily    carvedilol (COREG) tablet 3.125 mg  3.125 mg Oral BID With Meals    cyanocobalamin (VITAMIN B-12) tablet 1,000 mcg  1,000 mcg Oral Daily    dicyclomine (BENTYL) capsule 10 mg  10 mg Oral BID AC    divalproex sodium (DEPAKOTE SPRINKLE) capsule 250 mg  250 mg Oral Q12H RENY    donepezil (ARICEPT) tablet 5 mg  5 mg Oral HS    guaiFENesin (ROBITUSSIN) oral liquid 200 mg  200 mg Oral Q4H PRN    heparin (porcine) subcutaneous injection 5,000 Units  5,000 Units Subcutaneous Q8H RENY    ondansetron (ZOFRAN) injection 4 mg  4 mg Intravenous Q6H PRN    pravastatin (PRAVACHOL) tablet 20 mg  20 mg Oral Daily With Dinner    [START ON 11/13/2023] vancomycin (VANCOCIN) IVPB (premix in dextrose) 750 mg 150 mL  750 mg Intravenous Q24H     No Known Allergies    Objective   Vitals: Blood pressure 159/64, pulse 70, temperature 99.2 °F (37.3 °C), resp. rate 18, height 5' 4" (1.626 m), weight 59 kg (130 lb), SpO2 99 %. ,Body mass index is 22.31 kg/m². Intake/Output Summary (Last 24 hours) at 11/12/2023 1549  Last data filed at 11/12/2023 1401  Gross per 24 hour   Intake 10 ml   Output 200 ml   Net -190 ml     I/O last 24 hours: In: 10 [I.V.:10]  Out: 200 [Urine:200]    Invasive Devices       Peripheral Intravenous Line  Duration             Peripheral IV 11/12/23 Left;Ventral (anterior) Forearm <1 day                    Physical Exam  Vitals and nursing note reviewed. Constitutional:       General: He is not in acute distress. Appearance: Normal appearance. He is well-developed. He is not ill-appearing. HENT:      Head: Normocephalic and atraumatic. Right Ear: External ear normal.      Left Ear: External ear normal.   Eyes:      General:         Right eye: No discharge. Left eye: No discharge. Extraocular Movements: Extraocular movements intact.       Conjunctiva/sclera: Conjunctivae normal.   Cardiovascular:      Rate and Rhythm: Normal rate. Pulmonary:      Effort: Pulmonary effort is normal.   Musculoskeletal:      Cervical back: Normal range of motion and neck supple. Comments: See Ortho exam   Skin:     General: Skin is warm and dry. Capillary Refill: Capillary refill takes less than 2 seconds. Neurological:      Mental Status: He is alert and oriented to person, place, and time. Psychiatric:         Mood and Affect: Mood normal.       Right Elbow Exam     Tenderness   Right elbow tenderness location: Olecranon bursa. Range of Motion   Extension:  normal   Flexion:  normal   Pronation:  normal   Supination:  normal     Muscle Strength   Pronation:  5/5   Supination:  5/5     Other   Scars: absent  Sensation: normal  Pulse: present    Comments:  RUE: Scant erythema around right olecranon, scant collection within olecranon bursa, tenderness palpation olecranon bursa, arc of motion without pain at the elbow, no pain with supination or pronation, pain only with deep flexion of the elbow located posteriorly, compartments soft and compressible, neurovascular intact distally            Lab Results: CBC:   Lab Results   Component Value Date    WBC 7.09 11/12/2023    HGB 13.5 11/12/2023    HCT 40.2 11/12/2023    MCV 96 11/12/2023     11/12/2023    RBC 4.20 11/12/2023    MCH 32.1 11/12/2023    MCHC 33.6 11/12/2023    RDW 13.5 11/12/2023    MPV 12.3 11/12/2023     CMP:   Lab Results   Component Value Date    SODIUM 140 11/12/2023     (H) 11/12/2023    CO2 23 11/12/2023    BUN 29 (H) 11/12/2023    CREATININE 1.32 (H) 11/12/2023    CALCIUM 8.4 11/12/2023    EGFR 47 11/12/2023     PT/INR: No results found for: "PT", "INR"  ESR: No results found for: "ESR"  CRP: No results found for: "CRP"  Imaging Studies: I have personally reviewed pertinent films in PACS x-rays of the right elbow reviewed demonstrates no acute fracture dislocation of the elbow.   No foreign body appreciated. No significant collection at the olecranon bursa appreciated    Code Status: Level 1 - Full Code  Advance Directive and Living Will:      Power of : Yes  POLST:      Yousif Chakraborty D.O.   Division of Adult Reconstruction  Department of Orthopaedic Surgery  [facility]

## 2023-11-12 NOTE — PHYSICAL THERAPY NOTE
PT TREATMENT     11/12/23 8185   PT Last Visit   PT Visit Date 11/12/23   Note Type   Note Type Treatment   Pain Assessment   Pain Assessment Tool FLACC   Pain Score No Pain   Pain Rating: FLACC (Rest) - Face 0   Pain Rating: FLACC (Rest) - Legs 0   Pain Rating: FLACC (Rest) - Activity 0   Pain Rating: FLACC (Rest) - Cry 0   Pain Rating: FLACC (Rest) - Consolability 0   Score: FLACC (Rest) 0   Pain Rating: FLACC (Activity) - Face 1   Pain Rating: FLACC (Activity) - Legs 1   Pain Rating: FLACC (Activity) - Activity 1   Pain Rating: FLACC (Activity) - Cry 1   Pain Rating: FLACC (Activity) - Consolability 1   Score: FLACC (Activity) 5   Restrictions/Precautions   Other Precautions Chair Alarm; Bed Alarm;Cognitive; Airborne/isolation;Contact/isolation  (R elbow and knee are sore)   General   Chart Reviewed Yes   Cognition   Overall Cognitive Status Impaired   Arousal/Participation Cooperative   Following Commands Follows one step commands with increased time or repetition   Subjective   Subjective "I want to sit in a chair"   Bed Mobility   Supine to Sit 4  Minimal assistance   Sit to Supine 3  Moderate assistance   Additional items LE management   Transfers   Sit to Stand 3  Moderate assistance   Additional items Assist x 1;Assist x 2   Stand to Sit 3  Moderate assistance   Additional items Assist x 1;Assist x 2   Stand pivot 3  Moderate assistance   Additional items Assist x 1;Assist x 2   Ambulation/Elevation   Gait pattern   (antalgic R, R LE externally rotated, very flexed posture, posterior balance loss   Gait Assistance 3  Moderate assist   Additional items Assist x 2   Assistive Device   (hand held assist)   Distance 2x15 feet   Balance   Static Sitting Fair   Static Standing Fair -   Ambulatory Poor +   Activity Tolerance   Activity Tolerance Patient limited by fatigue;Patient limited by pain   Assessment   Prognosis Good   Problem List Decreased strength;Decreased endurance;Decreased mobility; Impaired balance;Pain   Assessment Pt remains generally weak and at risk to fall. Pt. requires assist x 1-2 to walk. Plan to try a walker next session to improve independence and tolerance to mobility. Pt's R elbow is red and sore, RN aware. The patient's AM-[facility] Basic Mobility Inpatient Short Form Raw Score is 10. A Raw score of less than or equal to 16 suggests the patient may benefit from discharge to post-acute rehabilitation services. Please also refer to the recommendation of the Physical Therapist for safe discharge planning. Plan   Treatment/Interventions LE strengthening/ROM; Functional transfer training;ADL retraining; Endurance training; Therapeutic exercise;Patient/family training;Cognitive reorientation;Gait training;Bed mobility; Equipment eval/education   Progress Slow progress, decreased activity tolerance   PT Frequency Other (Comment)  (5x/week)   AM-PAC Basic Mobility Inpatient   Turning in Flat Bed Without Bedrails 2   Lying on Back to Sitting on Edge of Flat Bed Without Bedrails 2   Moving Bed to Chair 2   Standing Up From Chair Using Arms 2   Walk in Room 1   Climb 3-5 Stairs With Railing 1   Basic Mobility Inpatient Raw Score 10   Turning Head Towards Sound 3   Follow Simple Instructions 2   Low Function Basic Mobility Raw Score  15   Low Function Basic Mobility Standardized Score  23.9   Highest Level Of Mobility   JH-HLM Goal 4: Move to chair/commode   JH-HLM Achieved 7: Walk 25 feet or more   End of Consult   Patient Position at End of Consult All needs within reach; Supine   Licensure   [place] License Number  Alonzo Figueroa PT [id number]

## 2023-11-12 NOTE — CASE MANAGEMENT
Case Management Progress Note    Patient name Shahla Fountain  Location [address] [place] 821-* MRN [id number]  : 1935 Date 2023       LOS (days): 5  Geometric Mean LOS (GMLOS) (days): 3.30  Days to GMLOS:-1.9        OBJECTIVE:        Current admission status: Inpatient  Preferred Pharmacy:   [facility] - [address] [facility]  [address] [facility]  [address] [address]  Phone: [phone] Fax: [phone]    Primary Care Provider: No primary care provider on file. Primary Insurance: MEDICARE  Secondary Insurance: BLUE CROSS    PROGRESS NOTE:      CM sent message in [employer] to [address] [place] [address] to check if a bed has become available for patient. Yonny confirmed the facility has a bed for patient today. CM checked patient's status with physician. Physician stated ortho will be assessing patient's elbow today and patient should be ready for discharge tomorrow. CM made physician aware that facility is ready for patient as soon as he is discharge ready. CM made Yonny at [address] [place] [address] aware that patient is not ready today but should be ready for discharge tomorrow. Yonny wasn't able to guaranteed bed availability tomorrow but CM will check to confirm in the morning once discharge is confirmed with physician. CM called and spoke with patient's Daughter Chelsea Cook to update on bed availability at [address] [place] [address]. CM explained a bed cannot be guaranteed for tomorrow but once discharge is confirmed in the morning we will get an update on bed availability from the facility. Chelsea Bar verbalized understanding and stated their preference is still for [address] [place] [address] because it is the closest to their mother. Chelsea Bar asked to speak with Physician regarding her father's medications as she is concerned on how he has been behaving and speaking, sounding very sedated and not speaking clearly. CM sent physician TT requesting a call be made to patient's daughter to discuss her concerns.

## 2023-11-12 NOTE — PROGRESS NOTES
Vanda Manley is a 80 y.o. male who is currently ordered Vancomycin IV with management by the Pharmacy Consult service. Relevant clinical data and objective / subjective history reviewed. Vancomycin Assessment:  Indication and Goal AUC/Trough: Soft tissue (goal -600, trough >10), -600, trough >10  Clinical Status: new  Micro:   pending  Renal Function:  SCr: 1.32 mg/dL  CrCl: 32.1 mL/min  Renal replacement: Not on dialysis  Days of Therapy: 1  Current Dose: 1500mg IV once as a loading dose  Vancomycin Plan:  New Dosinmg IV q24h  Estimated AUC: 469 mcg*hr/mL  Estimated Trough: 15.1 mcg/mL  Next Level: 11/15/23 @0600  Renal Function Monitoring: Daily BMP and [place] will continue to follow closely for s/sx of nephrotoxicity, infusion reactions and appropriateness of therapy. BMP and CBC will be ordered per protocol. We will continue to follow the patient’s culture results and clinical progress daily.     Gaudencio Moon, Pharmacist

## 2023-11-12 NOTE — PROGRESS NOTES
[address]  Progress Note  Name: Sharee Dash  MRN: [id number]  Unit/Bed#: [address] [facility] [address] I Date of Admission: 11/6/2023   Date of Service: 11/12/2023 I Hospital Day: 5    Assessment/Plan   * Acute encephalopathy  Assessment & Plan  Patient with baseline dementia but per family is generally cooperative, mild-mannered. Lives at assisted living facility, was sent to the ER after he became combative agitated combative and agitated. Diagnosed with COVID prior to arrival on 11/4    Encephalopathy appears to be secondary to his COVID-19 infection  Status post 3 days of IV remdesivir  Patient also has some new changes on echocardiogram with a mild drop in EF and some elevated troponin. Not felt to be acute cardiac event per cardiology. He is being optimized with addition of low-dose carvedilol. Has had some intermittent episodes of agitation and impulsiveness, but overall he has been cooperative. Elbow swelling, right  Assessment & Plan  Patient complaining of some right elbow swelling, posterior elbow is warm to the touch with minimal erythema. Does have a history of gout  Also had a fall prior to hospitalization could be trauma related    We will obtain x-rays of the elbow  Will ask orthopedic surgery for potential aspiration  Started on empiric vancomycin for olecranon bursitis    COVID-19  Assessment & Plan  Initially diagnosed on 11/4 at assisted living facility  Patient continues to be asymptomatic from respiratory standpoint and stable on room air. Encephalopathy is improving  Completed 3 days of IV remdesivir as patient is high risk given his dementia, comorbidities, and age. Contain contact and airborne precautions.     Acute kidney injury superimposed on CKD   Assessment & Plan  Prerenal etiology in the setting of COVID-19 infection and decreased p.o. intake  Now resolved with IV fluid resuscitation    Thrombocytopenia ([address])  Assessment & Plan  Transient thrombocytopenia in the setting of COVID infection  Now resolved    Elevated troponin  Assessment & Plan  Troponin around 950 on admission but was asymptomatic from a cardiac standpoint  Not felt to be an acute cardiac event per cardiology  Mild drop in EF on repeat echocardiogram  Appreciate cardiology recommendations; being optimized with addition of carvedilol    Dementia [facility])  Assessment & Plan  History of dementia but is typically mild-mannered and well behaved at the assisted living facility where he resides  Became combative and agitated prompting his visit to the ER. Also has COVID. Seen by psychiatry while inpatient; no new changes recommended, they recommended outpatient follow-up with psychiatry. Family working on transitioning patient to a longer term care facility. Will likely need rehab on discharge  Continue home Depakote and Aricept      Pacemaker  Assessment & Plan  Noted history. History of gout  Assessment & Plan  Continue allopurinol    Hyperlipidemia  Assessment & Plan  Continue low-dose statin    Essential hypertension  Assessment & Plan  BP has been labile while hospitalized  We will hold home losartan for now  Continue carvedilol        VTE Pharmacologic Prophylaxis: VTE Score: 5 High Risk (Score >/= 5) - Pharmacological DVT Prophylaxis Ordered: heparin. Sequential Compression Devices Ordered. Patient Centered Rounds: I performed bedside rounds with nursing staff today. Discussions with Specialists or Other Care Team Provider: MAYTE.     Education and Discussions with Family / Patient: Patient declined call to . Total Time Spent on Date of Encounter in care of patient: 35 mins.  This time was spent on one or more of the following: performing physical exam; counseling and coordination of care; obtaining or reviewing history; documenting in the medical record; reviewing/ordering tests, medications or procedures; communicating with other healthcare professionals and discussing with patient's family/caregivers. Current Length of Stay: 5 day(s)  Current Patient Status: Inpatient   Certification Statement: The patient will continue to require additional inpatient hospital stay due to disposition planning, clinical monitoring  Discharge Plan: Anticipate discharge in 24-48 hrs to rehab facility. Code Status: Level 1 - Full Code    Subjective:   Patient seen and examined. Patient doing well this morning. Resting comfortably in bed. Eating and drinking appropriately. No events overnight. No agitation. Objective:     Vitals:   Temp (24hrs), Av.2 °F (36.8 °C), Min:97.7 °F (36.5 °C), Max:99.2 °F (37.3 °C)    Temp:  [97.7 °F (36.5 °C)-99.2 °F (37.3 °C)] 97.7 °F (36.5 °C)  HR:  [70] 70  Resp:  [16-18] 16  BP: (112-147)/(61-99) 112/61  SpO2:  [97 %-98 %] 98 %  Body mass index is 22.31 kg/m². Input and Output Summary (last 24 hours): Intake/Output Summary (Last 24 hours) at 2023 1002  Last data filed at 2023  Gross per 24 hour   Intake 10 ml   Output --   Net 10 ml         PHYSICAL EXAM:    Vitals signs reviewed  Constitutional   Awake and cooperative. NAD. Head/Neck   Normocephalic. Atraumatic. HEENT   No scleral icterus. EOMI. Heart   Regular rate and rhythm. No murmers. Lungs   Clear to auscultation bilaterally. Respirations unlaboured. Abdomen   Soft. Nontender. Nondistended. Skin   Skin color normal. No rashes. Extremities   No deformities. No peripheral edema. Mild swelling and erythema in the area of the right olecranon. Significant tenderness to palpation. Neuro   Alert and oriented. No new deficits. Psych   Mood stable. Affect flat.          Additional Data:     Labs:  Results from last 7 days   Lab Units 23  0630 23  0308 23  0828   WBC Thousand/uL 9.25   < > 3.76*   HEMOGLOBIN g/dL 13.0   < > 12.4   HEMATOCRIT % 38.6   < > 37.6   PLATELETS Thousands/uL 163   < > 107*   NEUTROS PCT %  --   --  60   LYMPHS PCT %  -- --  25   MONOS PCT %  --   --  14*   EOS PCT %  --   --  0    < > = values in this interval not displayed. Results from last 7 days   Lab Units 11/11/23  0630 11/08/23  0828 11/07/23  0554   SODIUM mmol/L 140   < > 138   POTASSIUM mmol/L 3.9   < > 4.0   CHLORIDE mmol/L 112*   < > 108   CO2 mmol/L 20*   < > 21   BUN mg/dL 28*   < > 38*   CREATININE mg/dL 1.28   < > 1.78*   ANION GAP mmol/L 8   < > 9   CALCIUM mg/dL 8.3*   < > 7.5*   ALBUMIN g/dL  --   --  3.2*   TOTAL BILIRUBIN mg/dL  --   --  0.35   ALK PHOS U/L  --   --  50   ALT U/L  --   --  22   AST U/L  --   --  42*   GLUCOSE RANDOM mg/dL 98   < > 81    < > = values in this interval not displayed.                  Results from last 7 days   Lab Units 11/07/23  1414   PROCALCITONIN ng/ml 1.27*       Lines/Drains:  Invasive Devices       Peripheral Intravenous Line  Duration             Peripheral IV 11/06/23 Right Antecubital 6 days                          Imaging: Reviewed radiology reports from this admission including: ECHO    Recent Cultures (last 7 days):         Last 24 Hours Medication List:   Current Facility-Administered Medications   Medication Dose Route Frequency Provider Last Rate    acetaminophen  650 mg Oral Q6H PRN SHERRY Rolon      allopurinol  100 mg Oral Daily SHERRY Rolon      carvedilol  3.125 mg Oral BID With Meals Mckenzie Rod PA-C      vitamin B-12  1,000 mcg Oral Daily SHERRY Rolon      dicyclomine  10 mg Oral BID AC SHERRY Rolon      divalproex sodium  250 mg Oral Q12H [facility], DO      donepezil  5 mg Oral HS SHERRY Rolon      guaiFENesin  200 mg Oral Q4H PRN [facility], DO      heparin (porcine)  5,000 Units Subcutaneous Q8H [facility] SHERRY Rolon      ondansetron  4 mg Intravenous Q6H PRN SHERRY Rolon      pravastatin  20 mg Oral Daily With Martin & SHERRY Haley          Today, Patient Was Seen By: Jackson Gray DO    **Please Note: This note may have been constructed using a voice recognition system. **

## 2023-11-12 NOTE — ASSESSMENT & PLAN NOTE
Patient complaining of some right elbow swelling, posterior elbow is warm to the touch with minimal erythema. Does have a history of gout  Also had a fall prior to hospitalization could be trauma related    X-rays unremarkable  Evaluated by orthopedic surgery. Bursitis infectious bursitis and septic arthritis felt to be less likely. Deferred aspiration for now  On discharge we will treat for possible infectious olecranon bursitis with p.o. doxycycline to complete a 7-day course of antibiotic therapy. We will also treat with colchicine x7 days to treat for potential gout.

## 2023-11-13 VITALS
HEIGHT: 64 IN | SYSTOLIC BLOOD PRESSURE: 130 MMHG | BODY MASS INDEX: 22.2 KG/M2 | RESPIRATION RATE: 16 BRPM | TEMPERATURE: 97.5 F | OXYGEN SATURATION: 100 % | HEART RATE: 70 BPM | WEIGHT: 130 LBS | DIASTOLIC BLOOD PRESSURE: 63 MMHG

## 2023-11-13 LAB
ANION GAP SERPL CALCULATED.3IONS-SCNC: 7 MMOL/L
BUN SERPL-MCNC: 32 MG/DL (ref 5–25)
CALCIUM SERPL-MCNC: 8.4 MG/DL (ref 8.4–10.2)
CHLORIDE SERPL-SCNC: 111 MMOL/L (ref 96–108)
CO2 SERPL-SCNC: 24 MMOL/L (ref 21–32)
CREAT SERPL-MCNC: 1.31 MG/DL (ref 0.6–1.3)
ERYTHROCYTE [DISTWIDTH] IN BLOOD BY AUTOMATED COUNT: 13.3 % (ref 11.6–15.1)
GFR SERPL CREATININE-BSD FRML MDRD: 48 ML/MIN/1.73SQ M
GLUCOSE SERPL-MCNC: 88 MG/DL (ref 65–140)
HCT VFR BLD AUTO: 37.4 % (ref 36.5–49.3)
HGB BLD-MCNC: 11.9 G/DL (ref 12–17)
MCH RBC QN AUTO: 30.9 PG (ref 26.8–34.3)
MCHC RBC AUTO-ENTMCNC: 31.8 G/DL (ref 31.4–37.4)
MCV RBC AUTO: 97 FL (ref 82–98)
PLATELET # BLD AUTO: 231 THOUSANDS/UL (ref 149–390)
PMV BLD AUTO: 12 FL (ref 8.9–12.7)
POTASSIUM SERPL-SCNC: 4 MMOL/L (ref 3.5–5.3)
RBC # BLD AUTO: 3.85 MILLION/UL (ref 3.88–5.62)
SODIUM SERPL-SCNC: 142 MMOL/L (ref 135–147)
WBC # BLD AUTO: 6.52 THOUSAND/UL (ref 4.31–10.16)

## 2023-11-13 PROCEDURE — 85027 COMPLETE CBC AUTOMATED: CPT | Performed by: INTERNAL MEDICINE

## 2023-11-13 PROCEDURE — 80048 BASIC METABOLIC PNL TOTAL CA: CPT | Performed by: INTERNAL MEDICINE

## 2023-11-13 PROCEDURE — 99239 HOSP IP/OBS DSCHRG MGMT >30: CPT | Performed by: INTERNAL MEDICINE

## 2023-11-13 RX ORDER — DOXYCYCLINE HYCLATE 100 MG/1
100 CAPSULE ORAL EVERY 12 HOURS SCHEDULED
Qty: 11 CAPSULE | Refills: 0 | Status: SHIPPED | OUTPATIENT
Start: 2023-11-13 | End: 2023-11-19

## 2023-11-13 RX ORDER — COLCHICINE 0.6 MG/1
0.6 TABLET ORAL DAILY
Qty: 6 TABLET | Refills: 0
Start: 2023-11-14 | End: 2023-11-20

## 2023-11-13 RX ORDER — COLCHICINE 0.6 MG/1
1.2 TABLET ORAL ONCE
Status: COMPLETED | OUTPATIENT
Start: 2023-11-13 | End: 2023-11-13

## 2023-11-13 RX ORDER — CARVEDILOL 3.12 MG/1
3.12 TABLET ORAL 2 TIMES DAILY WITH MEALS
Refills: 0
Start: 2023-11-13

## 2023-11-13 RX ORDER — DOXYCYCLINE HYCLATE 100 MG/1
100 CAPSULE ORAL EVERY 12 HOURS SCHEDULED
Status: DISCONTINUED | OUTPATIENT
Start: 2023-11-13 | End: 2023-11-13 | Stop reason: HOSPADM

## 2023-11-13 RX ORDER — COLCHICINE 0.6 MG/1
0.6 TABLET ORAL DAILY
Status: DISCONTINUED | OUTPATIENT
Start: 2023-11-14 | End: 2023-11-13 | Stop reason: HOSPADM

## 2023-11-13 RX ADMIN — COLCHICINE 1.2 MG: 0.6 TABLET ORAL at 08:03

## 2023-11-13 RX ADMIN — CYANOCOBALAMIN TAB 500 MCG 1000 MCG: 500 TAB at 08:03

## 2023-11-13 RX ADMIN — ALLOPURINOL 100 MG: 100 TABLET ORAL at 08:03

## 2023-11-13 RX ADMIN — VANCOMYCIN HYDROCHLORIDE 750 MG: 750 INJECTION, SOLUTION INTRAVENOUS at 08:04

## 2023-11-13 RX ADMIN — HEPARIN SODIUM 5000 UNITS: 5000 INJECTION INTRAVENOUS; SUBCUTANEOUS at 06:02

## 2023-11-13 RX ADMIN — DIVALPROEX SODIUM 250 MG: 125 CAPSULE, COATED PELLETS ORAL at 08:04

## 2023-11-13 RX ADMIN — DOXYCYCLINE 100 MG: 100 CAPSULE ORAL at 10:35

## 2023-11-13 RX ADMIN — DICYCLOMINE HYDROCHLORIDE 10 MG: 10 CAPSULE ORAL at 06:02

## 2023-11-13 NOTE — NURSING NOTE
Patient discharged to [facility]. IV removed per hospital protocol, occlusive dressing applied. Report called to facility, spoke to receiving nurse. Call disconnected. Written discharge instruction sent with patient with return call information provided.  Patient left unit with all personal belongings, accompanied by transport team.

## 2023-11-13 NOTE — CASE MANAGEMENT
Case Management Discharge Planning Note    Patient name Candis Sainz  Location [address] [place]-* MRN [id number]  : 1935 Date 2023       Current Admission Date: 2023  Current Admission Diagnosis:Acute encephalopathy   Patient Active Problem List    Diagnosis Date Noted    Elbow swelling, right 2023    Thrombocytopenia ([address]) 2023    Acute kidney injury superimposed on CKD  2023    COVID-19 2023    Acute encephalopathy 2023    Dementia ([address]) 2023    Microscopic hematuria 2023    Elevated brain natriuretic peptide (BNP) level 2023    Elevated troponin 2023    Pacemaker 2023    MI (myocardial infarction) ([address]) 2023    Second degree heart block by electrocardiogram (ECG) 2023    Bradycardia 2023    History of heart attack 2023    Resistant hypertension 2023    Essential hypertension 2023    Hyperlipidemia 2023    History of gout 2023    Moderate dementia without behavioral disturbance, psychotic disturbance, mood disturbance, or anxiety ([address]) 2023    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 - [address]  [address]  [address] [address]  Phone: [phone] Fax: [phone]    Primary Care Provider: No primary care provider on file.     Primary Insurance: MEDICARE  Secondary Insurance: Yordan sultana CROSS    DISCHARGE DETAILS:    Discharge planning discussed with[de-identified] Daughter Tomas Ward of Choice: Yes    Comments - Freedom of Choice: Patient has been accepted by [facility] for STR with bed available today. Patient is medically cleared per attending. SW spoke with daughter Peter Torres by phone to review plan for discharge today and she is in agreement. SW requested transport via [facility] and a stretcher Tl Danker is scheduled for 12:00 PM pickup. SW notified daughter, facility, nursing and attending of pickup time. CM contacted family/caregiver?: Yes  Were Treatment Team discharge recommendations reviewed with patient/caregiver?: Yes  Did patient/caregiver verbalize understanding of patient care needs?: N/A- going to facility  Were patient/caregiver advised of the risks associated with not following Treatment Team discharge recommendations?: Yes    Contacts  Patient Contacts: Gee Titus (daughter)  Relationship to Patient[de-identified] Family  Contact Method: Phone  Phone Number: [phone]  Reason/Outcome: Emergency Contact, Discharge [address]         Is the patient interested in [address] at discharge?: No    DME Referral Provided  Referral made for DME?: No    Other Referral/Resources/Interventions Provided:  Interventions: Short Term Rehab, Transportation    Would you like to participate in our [address] [facility] Cswitch service program?  : No - Declined    Treatment Team Recommendation: Short Term Rehab  Discharge Destination Plan[de-identified] Short Term Rehab  Transport at Discharge : [facility]  Dispatcher Contacted: Yes  Number/Name of Dispatcher: [facility] via [facility]  Transported by [employer] and Unit #): [facility]  ETA of Transport (Date): 11/13/23  ETA of Transport (Time): 1200         IMM Given (Date):: 11/13/23  IMM Given to[de-identified] Family (IMM reviewed with dtr Peter Torres by phone and she verbalized acknowledgement. Daughter is in agreement with discharge determination.   Copy placed in label book for inclusion in pt's discharge paperwork per daughter's request. Copy placed in scan bin for chart.)        Accepting Facility Name, [address] : [facility]  Receiving Facility/Agency Phone Number: [phone]  Facility/Agency Fax Number: [phone]

## 2023-11-13 NOTE — DISCHARGE SUMMARY
[address]  Discharge- Omer Yu 1935, 80 y.o. male MRN: [id number]  Unit/Bed#: [address] [place] [address] Encounter: [id number]  Primary Care Provider: No primary care provider on file. Date and time admitted to hospital: 11/6/2023 11:01 PM    * Acute encephalopathy  Assessment & Plan  Patient with baseline dementia but per family is generally cooperative, mild-mannered. Lives at assisted living facility, was sent to the ER after he became combative agitated combative and agitated. Diagnosed with COVID prior to arrival on 11/4    Encephalopathy appears to be secondary to his COVID-19 infection  Status post 3 days of IV remdesivir  Patient also has some new changes on echocardiogram with a mild drop in EF and some elevated troponin. Not felt to be acute cardiac event per cardiology. He is being optimized with addition of low-dose carvedilol. Has had some intermittent episodes of agitation and impulsiveness, but overall he has been cooperative. Elbow swelling, right  Assessment & Plan  Patient complaining of some right elbow swelling, posterior elbow is warm to the touch with minimal erythema. Does have a history of gout  Also had a fall prior to hospitalization could be trauma related    X-rays unremarkable  Evaluated by orthopedic surgery. Bursitis infectious bursitis and septic arthritis felt to be less likely. Deferred aspiration for now  On discharge we will treat for possible infectious olecranon bursitis with p.o. doxycycline to complete a 7-day course of antibiotic therapy. We will also treat with colchicine x7 days to treat for potential gout. COVID-19  Assessment & Plan  Initially diagnosed on 11/4 at assisted living facility  Patient continues to be asymptomatic from respiratory standpoint and stable on room air. Encephalopathy is improving  Completed 3 days of IV remdesivir as patient is high risk given his dementia, comorbidities, and age.   Contain contact and airborne precautions. Acute kidney injury superimposed on CKD   Assessment & Plan  Prerenal etiology in the setting of COVID-19 infection and decreased p.o. intake  Now resolved with IV fluid resuscitation    Thrombocytopenia ([address])  Assessment & Plan  Transient thrombocytopenia in the setting of COVID infection  Now resolved    Elevated troponin  Assessment & Plan  Troponin around 950 on admission but was asymptomatic from a cardiac standpoint  Not felt to be an acute cardiac event per cardiology  Mild drop in EF on repeat echocardiogram  Appreciate cardiology recommendations; being optimized with addition of carvedilol    Dementia [facility])  Assessment & Plan  History of dementia but is typically mild-mannered and well behaved at the assisted living facility where he resides  Became combative and agitated prompting his visit to the ER. Also has COVID. Seen by psychiatry while inpatient; no new changes recommended, they recommended outpatient follow-up with psychiatry. Family working on transitioning patient to a longer term care facility. Will likely need rehab on discharge  Continue home Depakote and Aricept      Pacemaker  Assessment & Plan  Noted history. History of gout  Assessment & Plan  Continue allopurinol    Hyperlipidemia  Assessment & Plan  Continue low-dose statin    Essential hypertension  Assessment & Plan  BP has been labile while hospitalized  Losartan discontinued  Continue carvedilol          Discharging Physician / Practitioner: Terry Watson DO  PCP: No primary care provider on file.   Admission Date:   Admission Orders (From admission, onward)       Ordered        11/07/23 0509  Inpatient Admission  Once            11/07/23 0118  Place in Observation  Once                          Discharge Date: 11/13/23    Consultations During Hospital Stay:  Cardiology  Orthopedic surgery    Procedures Performed:   none    Significant Findings / Test Results:   Echocardiogram: LVEF mildly depressed at 50%, akinesis of the inferior basilar segments. Mild AS. Mild AI. CT head: No acute intracranial abnormality. Small metallic object in the left external auditory canal.  COVID-positive    Incidental Findings:   none    Test Results Pending at Discharge (will require follow up):   none     Outpatient Tests Requested:  nond    Complications:  none    Reason for Admission: Encephalopathy    Hospital Course:   Anahi Dietz is a 80 y.o. male patient who originally presented to the hospital on 11/6/2023 due to change in mental status. Patient has baseline dementia and lives at [facility] assisted living facility with his wife. He was sent to the emergency room due to agitation and a confrontation with his wife, as well as falls and altered mental status. In the emergency room trauma work-up was unremarkable. In addition to his encephalopathy he was found to have an acute kidney injury and elevated troponin. He was admitted to the hospital service. Kidney injury was prerenal in nature, and resolved with IV fluid hydration. In terms of the patient's COVID-19 infection, he was considered high risk given his age and comorbidities. He received 3 days of IV remdesivir but was generally asymptomatic from this standpoint. In terms of the patient's elevated troponin, cardiology was consulted after echocardiogram showed some new changes. However this was felt to be likely more secondary to his pacemaker insertion and less so from an acute cardiac event. He was optimized with p.o. Cardizem. CT head was ordered on admission and incidentally identified a small metallic object in the left external auditory canal.  This was extracted without complication and appeared to be a battery of some sort. Finally, patient was also found to have left elbow swelling during his hospitalization. Orthopedic surgery was consulted but deferred aspiration.   He will be treated for both infectious and gout induced olecranon bursitis on discharge with p.o. doxycycline and colchicine. Otherwise patient remains deconditioned at the time of discharge and will be discharged to SNF for rehabilitation. Discharge plan discussed at length with the patient's daughter via phone call. All questions and concerns addressed to satisfaction. Please see above list of diagnoses and related plan for additional information. Condition at Discharge: good    Discharge Day Visit / Exam:   Subjective: Patient seen and examined on day of discharge. No new complaints. No events overnight. Mental status stable. Stable on room air. Afebrile. Vitals: Blood Pressure: 130/63 (11/13/23 0918)  Pulse: 70 (11/13/23 0918)  Temperature: 97.5 °F (36.4 °C) (11/13/23 0814)  Temp Source: Axillary (11/13/23 0814)  Respirations: 16 (11/13/23 0814)  Height: 5' 4" (162.6 cm) (11/07/23 1316)  Weight - Scale: 59 kg (130 lb) (11/07/23 1316)  SpO2: 100 % (11/13/23 0918)  PHYSICAL EXAM:    Vitals signs reviewed  Constitutional   Awake and cooperative. NAD. Head/Neck   Normocephalic. Atraumatic. HEENT   No scleral icterus. EOMI. Heart   Regular rate and rhythm. No murmers. Lungs   Clear to auscultation bilaterally. Respirations unlaboured. Abdomen   Soft. Nontender. Nondistended. Skin   Skin color normal. No rashes. Extremities   No deformities. No peripheral edema. Neuro Alert. Oriented to self and place. No new deficits. Psych   Mood stable. Affect normal.         Discussion with Family: Updated  (daughter) via phone. Discharge instructions/Information to patient and family:   See after visit summary for information provided to patient and family. Provisions for Follow-Up Care:  See after visit summary for information related to follow-up care and any pertinent home health orders.        Disposition:   Other [facility] Readmission: No     Discharge Statement:  I spent 40 minutes discharging the patient. This time was spent on the day of discharge. I had direct contact with the patient on the day of discharge. Greater than 50% of the total time was spent examining patient, answering all patient questions, arranging and discussing plan of care with patient as well as directly providing post-discharge instructions. Additional time then spent on discharge activities. Discharge Medications:  See after visit summary for reconciled discharge medications provided to patient and/or family.       **Please Note: This note may have been constructed using a voice recognition system**

## 2023-11-13 NOTE — PROGRESS NOTES
Silvana Cotton is a 80 y.o. male who is currently ordered Vancomycin IV with management by the Pharmacy Consult service. Relevant clinical data and objective / subjective history reviewed. Vancomycin Assessment:  Indication and Goal AUC/Trough: Soft tissue (goal -600, trough >10), -600, trough >10  Clinical Status: stable  Micro:     Renal Function:  SCr: 1.31 mg/dL  CrCl: 32.4 mL/min  Renal replacement: Not on dialysis  Days of Therapy: 2  Current Dose: 1500mg IV once as a loading dose  Vancomycin Plan:  New Dosinmg IV q24h  Estimated AUC: 469 mcg*hr/mL  Estimated Trough: 15.1 mcg/mL  Next Level: 11/15/23 @ 0600  Renal Function Monitoring: Daily BMP and [place] will continue to follow closely for s/sx of nephrotoxicity, infusion reactions and appropriateness of therapy. BMP and CBC will be ordered per protocol. We will continue to follow the patient’s culture results and clinical progress daily.     Doron Mcgowan, Pharmacist

## 2023-11-13 NOTE — PLAN OF CARE
Problem: CARDIOVASCULAR - ADULT  Goal: Absence of cardiac dysrhythmias or at baseline rhythm  Description: INTERVENTIONS:  - Continuous cardiac monitoring, vital signs, obtain 12 lead EKG if ordered  - Administer antiarrhythmic and heart rate control medications as ordered  - Monitor electrolytes and administer replacement therapy as ordered  Outcome: Progressing     Problem: RESPIRATORY - ADULT  Goal: Achieves optimal ventilation and oxygenation  Description: INTERVENTIONS:  - Assess for changes in respiratory status  - Assess for changes in mentation and behavior  - Position to facilitate oxygenation and minimize respiratory effort  - Oxygen administered by appropriate delivery if ordered  - Initiate smoking cessation education as indicated  - Encourage broncho-pulmonary hygiene including cough, deep breathe, Incentive Spirometry  - Assess the need for suctioning and aspirate as needed  - Assess and instruct to report SOB or any respiratory difficulty  - Respiratory Therapy support as indicated  Outcome: Progressing

## 2023-11-13 NOTE — [place] UNIVERSAL TRANSFER FORM
[place] UNIVERSAL TRANSFER FORM  (ALL ITEMS MUST BE COMPLETED)    1. TRANSFER FROM: [address]      TRANSFER TO: [address] [place] [address]    2. DATE OF TRANSFER: 11/13/2023                        TIME OF TRANSFER: 1200    3. PATIENT NAME: Britta White,        YOB: 1935                             GENDER: male    4. LANGUAGE:   English    5. PHYSICIAN NAME:  Arsh Reyes, *                   PHONE: [phone]    6. CODE STATUS: Level 1 - Full Code        Out of Hospital DNR Attached: No    7. :                                      :  Extended Emergency Contact Information  Primary Emergency Contact: [address]  Mobile Phone: [phone]  Relation: Daughter  Secondary Emergency Contact: [address]  Mobile Phone: [phone]  Relation: Daughter           Health Care Representative/Proxy:  No           Legal Guardian:  No             NAME OF:           HEALTH CARE REPRESENTATIVE/PROXY:                                         OR           LEGAL GUARDIAN, IF NOT :                                               PHONE:  (Day)           (Night)                        (Cell)    8. REASON FOR TRANSFER: Post covid / hospitalization deconditioning. Patient requiring skilled rehab due to Decreased strength;Decreased endurance;Decreased mobility; Impaired balance;Pain             V/S: /63   Pulse 70   Temp 97.5 °F (36.4 °C) (Axillary)   Resp 16   Ht 5' 4" (1.626 m)   Wt 59 kg (130 lb)   SpO2 100%   BMI 22.31 kg/m²           PAIN: Yes, Site Right Elbow    9. PRIMARY DIAGNOSIS: Acute encephalopathy      Secondary Diagnosis:         Pacemaker: Yes      Internal Defib: No          Mental Health Diagnosis (if Applicable):    10. RESTRAINTS: No     11. RESPIRATORY NEEDS: None    12. ISOLATION/PRECAUTION: Comments COVID 19    13. ALLERGY: Patient has no known allergies.     14. SENSORY:       Vision Good, Hearing Good , and Speech Clear    15. SKIN CONDITION: No Wounds    16. DIET: Special (describe)Cardiac    17. IV ACCESS: None    18. PERSONAL ITEMS SENT WITH PATIENT: None    19. ATTACHED DOCUMENTS: MUST ATTACH CURRENT MEDICATION INFORMATION Face Sheet    20. AT RISK ALERTS:Falls        HARM TO: N/A    21. WEIGHT BEARING STATUS:         Left Leg: Limited        Right Leg: Limited    22. MENTAL STATUS:Alert, Forgetful, and Disoriented    23. FUNCTION:        Walk: With Help        Transfer: With Help        Toilet: With Help        Feed: With Help    24. IMMUNIZATIONS/SCREENING:     There is no immunization history on file for this patient. 25. BOWEL: Continent    26. BLADDER: Continent    27.  SENDING FACILITY CONTACT: Santiago Amanda                   Title: RN        Unit: [address] [facility]        Phone: [phone] [address] (if known):        Title:        Unit:         Phone:         FORM PREFILLED BY (if applicable)       Title:       Unit:        Phone:         FORM COMPLETED BY Yoselin Cruz RN      Title:       Phone:

## 2023-11-13 NOTE — PLAN OF CARE
Problem: CARDIOVASCULAR - ADULT  Goal: Maintains optimal cardiac output and hemodynamic stability  Description: INTERVENTIONS:  - Monitor I/O, vital signs and rhythm  - Monitor for S/S and trends of decreased cardiac output  - Administer and titrate ordered vasoactive medications to optimize hemodynamic stability  - Assess quality of pulses, skin color and temperature  - Assess for signs of decreased coronary artery perfusion  - Instruct patient to report change in severity of symptoms  Outcome: Progressing  Goal: Absence of cardiac dysrhythmias or at baseline rhythm  Description: INTERVENTIONS:  - Continuous cardiac monitoring, vital signs, obtain 12 lead EKG if ordered  - Administer antiarrhythmic and heart rate control medications as ordered  - Monitor electrolytes and administer replacement therapy as ordered  Outcome: Progressing     Problem: RESPIRATORY - ADULT  Goal: Achieves optimal ventilation and oxygenation  Description: INTERVENTIONS:  - Assess for changes in respiratory status  - Assess for changes in mentation and behavior  - Position to facilitate oxygenation and minimize respiratory effort  - Oxygen administered by appropriate delivery if ordered  - Initiate smoking cessation education as indicated  - Encourage broncho-pulmonary hygiene including cough, deep breathe, Incentive Spirometry  - Assess the need for suctioning and aspirate as needed  - Assess and instruct to report SOB or any respiratory difficulty  - Respiratory Therapy support as indicated  Outcome: Progressing     Problem: METABOLIC, FLUID AND ELECTROLYTES - ADULT  Goal: Electrolytes maintained within normal limits  Description: INTERVENTIONS:  - Monitor labs and assess patient for signs and symptoms of electrolyte imbalances  - Administer electrolyte replacement as ordered  - Monitor response to electrolyte replacements, including repeat lab results as appropriate  - Instruct patient on fluid and nutrition as appropriate  Outcome: Progressing  Goal: Fluid balance maintained  Description: INTERVENTIONS:  - Monitor labs   - Monitor I/O and WT  - Instruct patient on fluid and nutrition as appropriate  - Assess for signs & symptoms of volume excess or deficit  Outcome: Progressing  Goal: Glucose maintained within target range  Description: INTERVENTIONS:  - Monitor Blood Glucose as ordered  - Assess for signs and symptoms of hyperglycemia and hypoglycemia  - Administer ordered medications to maintain glucose within target range  - Assess nutritional intake and initiate nutrition service referral as needed  Outcome: Progressing     Problem: MOBILITY - ADULT  Goal: Maintain or return to baseline ADL function  Description: INTERVENTIONS:  -  Assess patient's ability to carry out ADLs; assess patient's baseline for ADL function and identify physical deficits which impact ability to perform ADLs (bathing, care of mouth/teeth, toileting, grooming, dressing, etc.)  - Assess/evaluate cause of self-care deficits   - Assess range of motion  - Assess patient's mobility; develop plan if impaired  - Assess patient's need for assistive devices and provide as appropriate  - Encourage maximum independence but intervene and supervise when necessary  - Involve family in performance of ADLs  - Assess for home care needs following discharge   - Consider OT consult to assist with ADL evaluation and planning for discharge  - Provide patient education as appropriate  Outcome: Progressing  Goal: Maintains/Returns to pre admission functional level  Description: INTERVENTIONS:  - Perform BMAT or MOVE assessment daily.   - Set and communicate daily mobility goal to care team and patient/family/caregiver. - Collaborate with rehabilitation services on mobility goals if consulted  - Perform Range of Motion 2 times a day. - Reposition patient every 2 hours.   - Dangle patient 3 times a day  - Stand patient 3 times a day  - Ambulate patient 3 times a day  - Out of bed to chair 3 times a day   - Out of bed for meals 3 times a day  - Out of bed for toileting  - Record patient progress and toleration of activity level   Outcome: Progressing     Problem: Prexisting or High Potential for Compromised Skin Integrity  Goal: Skin integrity is maintained or improved  Description: INTERVENTIONS:  - Identify patients at risk for skin breakdown  - Assess and monitor skin integrity  - Assess and monitor nutrition and hydration status  - Monitor labs   - Assess for incontinence   - Turn and reposition patient  - Assist with mobility/ambulation  - Relieve pressure over bony prominences  - Avoid friction and shearing  - Provide appropriate hygiene as needed including keeping skin clean and dry  - Evaluate need for skin moisturizer/barrier cream  - Collaborate with interdisciplinary team   - Patient/family teaching  - Consider wound care consult   Outcome: Progressing

## 2023-12-16 ENCOUNTER — APPOINTMENT (EMERGENCY)
Dept: RADIOLOGY | Facility: HOSPITAL | Age: 88
End: 2023-12-16
Payer: MEDICARE

## 2023-12-16 ENCOUNTER — HOSPITAL ENCOUNTER (EMERGENCY)
Facility: HOSPITAL | Age: 88
Discharge: HOME | End: 2023-12-16
Attending: EMERGENCY MEDICINE
Payer: MEDICARE

## 2023-12-16 VITALS
SYSTOLIC BLOOD PRESSURE: 136 MMHG | TEMPERATURE: 98 F | RESPIRATION RATE: 18 BRPM | HEART RATE: 70 BPM | DIASTOLIC BLOOD PRESSURE: 64 MMHG | OXYGEN SATURATION: 97 %
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
## 2023-12-16 DIAGNOSIS — M25.559 HIP PAIN: ICD-10-CM

## 2023-12-16 DIAGNOSIS — S00.01XA ABRASION OF SCALP, INITIAL ENCOUNTER: ICD-10-CM

## 2023-12-16 LAB
BACTERIA UR QL AUTO: ABNORMAL /HPF
BILIRUB UR QL STRIP: NEGATIVE
CLARITY UR: CLEAR
COLOR UR: YELLOW
GLUCOSE UR STRIP-MCNC: NEGATIVE MG/DL
HGB UR QL STRIP.AUTO: ABNORMAL
KETONES UR STRIP-MCNC: ABNORMAL MG/DL
LEUKOCYTE ESTERASE UR QL STRIP: NEGATIVE
NITRITE UR QL STRIP: NEGATIVE
NON-SQ EPI CELLS URNS QL MICRO: ABNORMAL /HPF
PH UR STRIP.AUTO: 5.5 [PH]
PROT UR STRIP-MCNC: ABNORMAL MG/DL
RBC #/AREA URNS AUTO: ABNORMAL /HPF
SP GR UR STRIP.AUTO: 1.02 (ref 1–1.03)
UROBILINOGEN UR QL STRIP.AUTO: 0.2 E.U./DL
WBC #/AREA URNS AUTO: ABNORMAL /HPF

## 2023-12-16 PROCEDURE — G1004 CDSM NDSC: HCPCS

## 2023-12-16 PROCEDURE — 72125 CT NECK SPINE W/O DYE: CPT

## 2023-12-16 PROCEDURE — 74176 CT ABD & PELVIS W/O CONTRAST: CPT

## 2023-12-16 PROCEDURE — 93005 ELECTROCARDIOGRAM TRACING: CPT

## 2023-12-16 PROCEDURE — 90715 TDAP VACCINE 7 YRS/> IM: CPT | Performed by: EMERGENCY MEDICINE

## 2023-12-16 PROCEDURE — 73564 X-RAY EXAM KNEE 4 OR MORE: CPT

## 2023-12-16 PROCEDURE — 70450 CT HEAD/BRAIN W/O DYE: CPT

## 2023-12-16 PROCEDURE — 99285 EMERGENCY DEPT VISIT HI MDM: CPT | Performed by: EMERGENCY MEDICINE

## 2023-12-16 PROCEDURE — 99284 EMERGENCY DEPT VISIT MOD MDM: CPT

## 2023-12-16 PROCEDURE — 90471 IMMUNIZATION ADMIN: CPT

## 2023-12-16 PROCEDURE — 73610 X-RAY EXAM OF ANKLE: CPT

## 2023-12-16 PROCEDURE — 81001 URINALYSIS AUTO W/SCOPE: CPT | Performed by: EMERGENCY MEDICINE

## 2023-12-16 RX ORDER — ACETAMINOPHEN 325 MG/1
975 TABLET ORAL ONCE
Status: COMPLETED | OUTPATIENT
Start: 2023-12-16 | End: 2023-12-16

## 2023-12-16 RX ADMIN — TETANUS TOXOID, REDUCED DIPHTHERIA TOXOID AND ACELLULAR PERTUSSIS VACCINE, ADSORBED 0.5 ML: 5; 2.5; 8; 8; 2.5 SUSPENSION INTRAMUSCULAR at 05:19

## 2023-12-16 RX ADMIN — ACETAMINOPHEN 975 MG: 325 TABLET ORAL at 05:18

## 2023-12-16 NOTE — ED PROVIDER NOTES
Final Diagnosis:  1. Fall, initial encounter    2. Abrasion of scalp, initial encounter    3. Hip pain        Chief Complaint   Patient presents with    Fall     Patient fell c/o neck, back, hip, knee pain, arrived with collar on        HPI  Patient presents after he fell. Onto his back reportedly though pt has non memory of 2/2 dementia. Arrives in collar. Has no neck pain. It's in his mouth. I remove it. I sit him up. Only real new pain beyond chronic back pain is in his left hip and left knee and left ankle. Feels sore through there. Can still bear weight. Stands/walks with a walker. No pain on palpation of back, just stiff and sore sitting up. Soft abd. B/l breath sounds. No chest wall tenderness.     EMS reports if applicable: N/A     - Previous charting underwent limited review with attention to last ED visits, labs, ekgs, and prior imaging.  Chart review reveals :     No results displayed because visit has over 200 results.          - No language barrier.   - History obtained from patient .   - There are no limitations to the history obtained.  - Discuss patient's care, with patient permission or by chart review, with      PMH:   has a past medical history of Alzheimer disease (HCC), CAD (coronary artery disease), GERD (gastroesophageal reflux disease), Gout, History of heart attack, HLD (hyperlipidemia), and Hypertension.    PSH:   has a past surgical history that includes Hiatal hernia repair and Cardiac electrophysiology procedure (N/A, 4/14/2023).     Social History:  Tobacco Use: Medium Risk (12/16/2023)    Patient History     Smoking Tobacco Use: Former     Smokeless Tobacco Use: Never     Passive Exposure: Not on file     Alcohol Use: Not on file     No illicit use       ROS:  Pertinent positives/negatives: .     Some ROS may be present in the HPI and would take precedent over these standard questions asked below.   Review of Systems   Unable to perform ROS: Dementia            PE:     Physical exam  highlights:   Physical Exam       Vitals:    12/16/23 0710 12/16/23 0740 12/16/23 0840 12/16/23 1015   BP: 167/70 (!) 153/105 139/56 136/64   BP Location:    Left arm   Pulse: 70 70 70 70   Resp:       Temp:    98 °F (36.7 °C)   TempSrc:    Tympanic   SpO2: 97% 97% 97% 97%     Vitals reviewed by me.   Nursing note reviewed  Chaperone present for all sensitive exam.  Const: No acute distress. Alert. Nontoxic. Not diaphoretic.    HEENT: External ears normal. No protrusion drainage swelling. Nose normal. No drainage/traumatic deformity. MMM. Mouth with baseline/symmetric movement. No trismus.   Eyes: No squinting. No icterus. No tearing/swelling/drainage. Tracks through the room with normal EOM.   Neck: ROM normal. No rigidity. No meningismus.  Cards: Rate as per vitals Compared to monitor sinus unless documented. Regular Well perfused.  Pulm: able to verbalize without additional effort. Effort and excursion normal. No distress. No audible wheezing/ stridor. Normal resp rate without retraction or change in work of breathing.  Abd: No distension beyond baseline. No fluctuant wave. Patient without peritoneal pain with shifting/bumping the bed.   MSK: ROM normal baseline. No deformity. No contractures from baseline. Pain on palpation through left hip/knee and ankle. Still bearing weight w/ walker.   Skin: No new rashes visible. Well perfused. No wounds visualized on exposed skin  Neuro: Nonfocal. Baseline. CN grossly intact. Moving all four with coordination.   Psych: Normal behavior and affect.        A:  - Nursing note reviewed.    Ddx and MDM  Considered diagnoses    R/o pelvic fx, knee injury, ankle injury.    Otherwise r/o ICH and C spine injury w/ CT    No injury on prelim xr reads.         My conversation with consultant reveals: NA       Decision rules:                           My read of the XR/CT scan reveals:  NA   XR knee 4+ vw left injury   Final Result      No acute osseous abnormality.             Workstation performed: [id number]         XR ankle 3+ views LEFT   Final Result      No acute osseous abnormality.            Workstation performed: [id number]         CT abdomen pelvis wo contrast   Final Result   Addendum (preliminary) 1 of 1   ADDENDUM:      Clarification to incidental finding of the right wrist. It is dorsal    displacement of the lunate. In addition, these findings were described on    an outside right hand and wrist radiograph report.      Final      No acute fracture or dislocation.      Nonspecific mild distention of mid small bowel with minimal perienteric stranding and scattered gas fluid levels. Findings may represent enteritis in the appropriate clinical context. If there is clinical concern for low-grade SBO, suggest follow-up CT    with IV and enteric contrast in 2 to 4 hours.      Minimal free fluid in the pelvis.      Colonic diverticulosis.      Additional chronic findings and negatives as above.      This study demonstrates a significant  finding and was documented as such in [facility] for liaison and referring practitioner notification.      Workstation performed: [id number]         CT spine cervical without contrast   Final Result      No cervical spine fracture or traumatic malalignment.                  Workstation performed: [id number]         CT head without contrast   Final Result      No acute intracranial process.      No skull fracture.      Chronic microangiopathy.                  Workstation performed: [id number]             Orders Placed This Encounter   Procedures    CT head without contrast    CT spine cervical without contrast    CT abdomen pelvis wo contrast    XR knee 4+ vw left injury    XR ankle 3+ views LEFT    UA (URINE) with reflex to Scope    Urine Microscopic    ECG 12 lead     Labs Reviewed   URINALYSIS WITH REFLEX TO SCOPE - Abnormal       Result Value Ref Range Status    Color, UA Yellow   Final    Clarity, UA Clear   Final    Specific Gravity, UA 1.025   1.000 - 1.030 Final    pH, UA 5.5  5.0, 5.5, 6.0, 6.5, 7.0, 7.5, 8.0, 8.5, 9.0 Final    Leukocytes, UA Negative  Negative Final    Nitrite, UA Negative  Negative Final    Protein, UA Trace (*) Negative mg/dl Final    Glucose, UA Negative  Negative mg/dl Final    Ketones, UA Trace (*) Negative mg/dl Final    Urobilinogen, UA 0.2  0.2, 1.0 E.U./dl E.U./dl Final    Bilirubin, UA Negative  Negative Final    Occult Blood, UA Moderate (*) Negative Final   URINE MICROSCOPIC - Abnormal    RBC, UA 2-4  None Seen, 0-1, 1-2, 2-4, 0-5 /hpf Final    WBC, UA 0-1  None Seen, 0-1, 1-2, 0-5, 2-4 /hpf Final    Epithelial Cells None Seen  None Seen, Occasional /hpf Final    Bacteria, UA Moderate (*) None Seen, Occasional /hpf Final       *Each of these labs was reviewed. Particular standout labs will be noted in the ED Course above     Final Diagnosis:  1. Fall, initial encounter    2. Abrasion of scalp, initial encounter    3. Hip pain          P:  - hospital tx includes   Medications   acetaminophen (TYLENOL) tablet 975 mg (975 mg Oral Given 12/16/23 0518)   tetanus-diphtheria-acellular pertussis (BOOSTRIX) IM injection 0.5 mL (0.5 mL Intramuscular Given 12/16/23 0519)         - disposition  Time reflects when diagnosis was documented in both MDM as applicable and the Disposition within this note       Time User Action Codes Description Comment    12/16/2023  7:26 AM Roni Knowles Add [W19.XXXA] Fall, initial encounter     12/16/2023  7:26 AM Roni Knowles Add [S00.01XA] Abrasion of scalp, initial encounter     12/16/2023  7:26 AM Roni Knowles Add [M25.559] Hip pain     12/16/2023  7:26 AM Roni Knowles Modify [M25.559] Hip pain left          ED Disposition       ED Disposition   Discharge    Condition   Stable    Date/Time   Sat Dec 16, 2023  7:25 AM    Comment   Michael Strong discharge to home/self care.                   Follow-up Information    None         - patient will call their PCP to let them know they were in  the emergency department. We discuss return precautions and patient is agreeable with plan and aformentioned disposition.       - additional treatment intended, if consistent with primary provider:  - patient to follow with :      Discharge Medication List as of 12/16/2023 10:13 AM        CONTINUE these medications which have NOT CHANGED    Details   allopurinol (ZYLOPRIM) 100 mg tablet Take 1 tablet by mouth in the morning, Starting Mon 2/27/2023, Historical Med      carvedilol (COREG) 3.125 mg tablet Take 1 tablet (3.125 mg total) by mouth 2 (two) times a day with meals, Starting Mon 11/13/2023, No Print      colchicine (COLCRYS) 0.6 mg tablet Take 1 tablet (0.6 mg total) by mouth daily for 6 days Do not start before November 14, 2023., Starting Tue 11/14/2023, Until Mon 11/20/2023, No Print      dicyclomine (BENTYL) 10 mg capsule Take 1 capsule by mouth 2 (two) times a day before meals, Starting Mon 2/27/2023, Historical Med      divalproex sodium (DEPAKOTE) 125 mg EC tablet Take 250 mg by mouth every 12 (twelve) hours, Starting Mon 2/27/2023, Historical Med      donepezil (ARICEPT) 5 mg tablet Take 1 tablet (5 mg total) by mouth daily at bedtime, Starting Thu 3/23/2023, No Print      simvastatin (ZOCOR) 10 mg tablet Take 1 tablet (10 mg total) by mouth daily at bedtime, Starting Thu 3/23/2023, No Print      vitamin B-12 (VITAMIN B-12) 1,000 mcg tablet Take 1 tablet by mouth in the morning, Starting Mon 2/27/2023, Historical Med           No discharge procedures on file.  Prior to Admission Medications   Prescriptions Last Dose Informant Patient Reported? Taking?   allopurinol (ZYLOPRIM) 100 mg tablet  Self Yes No   Sig: Take 1 tablet by mouth in the morning   carvedilol (COREG) 3.125 mg tablet   No No   Sig: Take 1 tablet (3.125 mg total) by mouth 2 (two) times a day with meals   colchicine (COLCRYS) 0.6 mg tablet   No No   Sig: Take 1 tablet (0.6 mg total) by mouth daily for 6 days Do not start before November  "14, 2023.   dicyclomine (BENTYL) 10 mg capsule  Self Yes No   Sig: Take 1 capsule by mouth 2 (two) times a day before meals   divalproex sodium (DEPAKOTE) 125 mg EC tablet  Self Yes No   Sig: Take 250 mg by mouth every 12 (twelve) hours   donepezil (ARICEPT) 5 mg tablet  Self No No   Sig: Take 1 tablet (5 mg total) by mouth daily at bedtime   simvastatin (ZOCOR) 10 mg tablet  Self No No   Sig: Take 1 tablet (10 mg total) by mouth daily at bedtime   vitamin B-12 (VITAMIN B-12) 1,000 mcg tablet  Self Yes No   Sig: Take 1 tablet by mouth in the morning      Facility-Administered Medications: None       Portions of the record may have been created with voice recognition software. Occasional wrong word or \"sound a like\" substitutions may have occurred due to the inherent limitations of voice recognition software. Read the chart carefully and recognize, using context, where substitutions have occurred.    Electronically signed by:  MD Roni Hubbard MD  12/18/23 7580    "

## 2023-12-17 ENCOUNTER — APPOINTMENT (EMERGENCY)
Dept: RADIOLOGY | Facility: HOSPITAL | Age: 88
End: 2023-12-17
Payer: MEDICARE

## 2023-12-17 ENCOUNTER — HOSPITAL ENCOUNTER (EMERGENCY)
Facility: HOSPITAL | Age: 88
Discharge: HOME | End: 2023-12-17
Attending: EMERGENCY MEDICINE
Payer: MEDICARE

## 2023-12-17 VITALS
TEMPERATURE: 96.8 F | HEART RATE: 70 BPM | DIASTOLIC BLOOD PRESSURE: 58 MMHG | OXYGEN SATURATION: 97 % | SYSTOLIC BLOOD PRESSURE: 126 MMHG | RESPIRATION RATE: 18 BRPM

## 2023-12-17 DIAGNOSIS — Z86.59 HISTORY OF DEMENTIA: ICD-10-CM

## 2023-12-17 DIAGNOSIS — R29.6 FREQUENT FALLS: Primary | ICD-10-CM

## 2023-12-17 LAB
ATRIAL RATE: 85 BPM
QRS AXIS: -47 DEGREES
QRSD INTERVAL: 122 MS
QT INTERVAL: 442 MS
QTC INTERVAL: 486 MS
T WAVE AXIS: 93 DEGREES
VENTRICULAR RATE: 73 BPM

## 2023-12-17 PROCEDURE — 99284 EMERGENCY DEPT VISIT MOD MDM: CPT | Performed by: EMERGENCY MEDICINE

## 2023-12-17 PROCEDURE — 73630 X-RAY EXAM OF FOOT: CPT

## 2023-12-17 PROCEDURE — 73521 X-RAY EXAM HIPS BI 2 VIEWS: CPT

## 2023-12-17 PROCEDURE — 70450 CT HEAD/BRAIN W/O DYE: CPT

## 2023-12-17 PROCEDURE — 72125 CT NECK SPINE W/O DYE: CPT

## 2023-12-17 PROCEDURE — 99284 EMERGENCY DEPT VISIT MOD MDM: CPT

## 2023-12-17 PROCEDURE — G1004 CDSM NDSC: HCPCS

## 2023-12-17 NOTE — ED PROVIDER NOTES
History  Chief Complaint   Patient presents with    Fall     Pt had an unwitness fall from [place] found on the floor by staff.      Patient is an 88-year-old male with a history of dementia, hyperlipidemia, hypertension, CAD, GERD that presents emergency department via EMS for evaluation after an unwitnessed fall.  Patient was found to be laying on the floor beside his bed.  Patient reported to be complaining about left foot pain.      History provided by:  Patient   used: No    Fall      Prior to Admission Medications   Prescriptions Last Dose Informant Patient Reported? Taking?   allopurinol (ZYLOPRIM) 100 mg tablet  Self Yes No   Sig: Take 1 tablet by mouth in the morning   carvedilol (COREG) 3.125 mg tablet   No No   Sig: Take 1 tablet (3.125 mg total) by mouth 2 (two) times a day with meals   colchicine (COLCRYS) 0.6 mg tablet   No No   Sig: Take 1 tablet (0.6 mg total) by mouth daily for 6 days Do not start before November 14, 2023.   dicyclomine (BENTYL) 10 mg capsule  Self Yes No   Sig: Take 1 capsule by mouth 2 (two) times a day before meals   divalproex sodium (DEPAKOTE) 125 mg EC tablet  Self Yes No   Sig: Take 250 mg by mouth every 12 (twelve) hours   donepezil (ARICEPT) 5 mg tablet  Self No No   Sig: Take 1 tablet (5 mg total) by mouth daily at bedtime   simvastatin (ZOCOR) 10 mg tablet  Self No No   Sig: Take 1 tablet (10 mg total) by mouth daily at bedtime   vitamin B-12 (VITAMIN B-12) 1,000 mcg tablet  Self Yes No   Sig: Take 1 tablet by mouth in the morning      Facility-Administered Medications: None       Past Medical History:   Diagnosis Date    Alzheimer disease (HCC)     CAD (coronary artery disease)     GERD (gastroesophageal reflux disease)     Gout     History of heart attack     HLD (hyperlipidemia)     Hypertension        Past Surgical History:   Procedure Laterality Date    CARDIAC ELECTROPHYSIOLOGY PROCEDURE N/A 4/14/2023    Procedure: Cardiac pacer implant;   Surgeon: Elle Jhaveri MD;  Location: [facility];  Service: Cardiology    HIATAL HERNIA REPAIR         No family history on file.  I have reviewed and agree with the history as documented.    E-Cigarette/Vaping    E-Cigarette Use Never User      E-Cigarette/Vaping Substances    Nicotine No     THC No     CBD No     Flavoring No     Other No     Unknown No      Social History     Tobacco Use    Smoking status: Former     Current packs/day: 0.00     Types: Cigarettes     Quit date:      Years since quittin.9    Smokeless tobacco: Never   Vaping Use    Vaping status: Never Used   Substance Use Topics    Alcohol use: Not Currently    Drug use: Never       Review of Systems   Unable to perform ROS: Dementia       Physical Exam  Physical Exam  Vitals and nursing note reviewed.   Constitutional:       Appearance: He is well-developed.   HENT:      Head: Normocephalic and atraumatic.   Eyes:      Pupils: Pupils are equal, round, and reactive to light.   Cardiovascular:      Rate and Rhythm: Normal rate and regular rhythm.      Heart sounds: Normal heart sounds.   Pulmonary:      Effort: Pulmonary effort is normal.      Breath sounds: Normal breath sounds.   Abdominal:      General: Bowel sounds are normal. There is no distension.      Palpations: Abdomen is soft. There is no mass.      Tenderness: There is no abdominal tenderness. There is no guarding or rebound.   Musculoskeletal:         General: Swelling and tenderness present.      Cervical back: No tenderness.      Right hip: Normal.      Left hip: Tenderness and bony tenderness present. Decreased range of motion.      Right knee: Normal.      Left knee: Bony tenderness present. Decreased range of motion. Tenderness present over the medial joint line and lateral joint line.      Right ankle: Normal.      Left ankle: Tenderness present. Decreased range of motion.   Skin:     General: Skin is warm and dry.      Capillary Refill: Capillary refill  takes less than 2 seconds.   Neurological:      Mental Status: He is alert. Mental status is at baseline. He is disoriented.         Vital Signs  ED Triage Vitals [12/17/23 0953]   Temperature Pulse Respirations Blood Pressure SpO2   (!) 96.8 °F (36 °C) 68 18 126/58 95 %      Temp Source Heart Rate Source Patient Position - Orthostatic VS BP Location FiO2 (%)   Tympanic Monitor -- -- --      Pain Score       --           Vitals:    12/17/23 0953 12/17/23 1000   BP: 126/58 126/58   Pulse: 68 70         Visual Acuity      ED Medications  Medications - No data to display    Diagnostic Studies  Results Reviewed       None                   XR hips bilateral 2 vw w pelvis if performed   Final Result by James Brunson MD (12/17 1112)      No acute osseous abnormality.            Workstation performed: [id number]         XR foot 3+ views LEFT   Final Result by James Brunson MD (12/17 1111)      No acute osseous abnormality.            Workstation performed: [id number]         CT head without contrast   Final Result by Christos Cain MD (12/17 1152)      No acute intracranial abnormality.  Chronic microangiopathic changes.                  Workstation performed: [id number]         CT cervical spine without contrast   Final Result by Christos Cain MD (12/17 1152)      No cervical spine fracture or traumatic malalignment.                  Workstation performed: [id number]                    Procedures  Procedures         ED Course                               SBIRT 22yo+      Flowsheet Row Most Recent Value   Initial Alcohol Screen: US AUDIT-C     1. How often do you have a drink containing alcohol? 0 Filed at: 12/17/2023 1002   2. How many drinks containing alcohol do you have on a typical day you are drinking?  0 Filed at: 12/17/2023 1002   3b. FEMALE Any Age, or MALE 65+: How often do you have 4 or more drinks on one occassion? 0 Filed at: 12/17/2023 1002   Audit-C Score 0 Filed at: 12/17/2023 1002    LLOYD: How many times in the past year have you...    Used an illegal drug or used a prescription medication for non-medical reasons? Never Filed at: 12/17/2023 1002                      Medical Decision Making  88-year-old male with a history of dementia presents after another unwitnessed fall.  No acute traumatic pathology identified on CT and x-rays.  Patient be discharged back to his nursing home.    Amount and/or Complexity of Data Reviewed  Radiology: ordered.             Disposition  Final diagnoses:   Frequent falls   History of dementia     Time reflects when diagnosis was documented in both MDM as applicable and the Disposition within this note       Time User Action Codes Description Comment    12/17/2023 11:54 AM Kd Prado Add [R29.6] Frequent falls     12/17/2023 11:54 AM Kd Prado [Z86.59] History of dementia           ED Disposition       ED Disposition   Discharge    Condition   Stable    Date/Time   Sun Dec 17, 2023 1154    Comment   Michael Tae discharge to home/self care.                   Follow-up Information    None         Discharge Medication List as of 12/17/2023 11:54 AM        CONTINUE these medications which have NOT CHANGED    Details   allopurinol (ZYLOPRIM) 100 mg tablet Take 1 tablet by mouth in the morning, Starting Mon 2/27/2023, Historical Med      carvedilol (COREG) 3.125 mg tablet Take 1 tablet (3.125 mg total) by mouth 2 (two) times a day with meals, Starting Mon 11/13/2023, No Print      colchicine (COLCRYS) 0.6 mg tablet Take 1 tablet (0.6 mg total) by mouth daily for 6 days Do not start before November 14, 2023., Starting Tue 11/14/2023, Until Mon 11/20/2023, No Print      dicyclomine (BENTYL) 10 mg capsule Take 1 capsule by mouth 2 (two) times a day before meals, Starting Mon 2/27/2023, Historical Med      divalproex sodium (DEPAKOTE) 125 mg EC tablet Take 250 mg by mouth every 12 (twelve) hours, Starting Mon 2/27/2023, Historical Med       donepezil (ARICEPT) 5 mg tablet Take 1 tablet (5 mg total) by mouth daily at bedtime, Starting Thu 3/23/2023, No Print      simvastatin (ZOCOR) 10 mg tablet Take 1 tablet (10 mg total) by mouth daily at bedtime, Starting Thu 3/23/2023, No Print      vitamin B-12 (VITAMIN B-12) 1,000 mcg tablet Take 1 tablet by mouth in the morning, Starting Mon 2/27/2023, Historical Med             No discharge procedures on file.    PDMP Review       None            ED Provider  Electronically Signed by             Kd Prado DO  12/17/23 0223

## 2023-12-17 NOTE — ED NOTES
Transportation home booked with [facility], awaiting accepting transport and pickup time- will update.     Tegan Gaytan RN  12/17/23 1202    Transportation update, pickup by [facility] scheduled for 1pm     Tegan Gaytan RN  12/17/23 2636

## 2023-12-20 ENCOUNTER — HOSPITAL ENCOUNTER (EMERGENCY)
Facility: HOSPITAL | Age: 88
Discharge: HOME | End: 2023-12-20
Attending: STUDENT IN AN ORGANIZED HEALTH CARE EDUCATION/TRAINING PROGRAM
Payer: MEDICARE

## 2023-12-20 ENCOUNTER — APPOINTMENT (EMERGENCY)
Dept: RADIOLOGY | Facility: HOSPITAL | Age: 88
End: 2023-12-20
Payer: MEDICARE

## 2023-12-20 VITALS
OXYGEN SATURATION: 98 % | HEART RATE: 70 BPM | DIASTOLIC BLOOD PRESSURE: 61 MMHG | RESPIRATION RATE: 18 BRPM | TEMPERATURE: 96.8 F | SYSTOLIC BLOOD PRESSURE: 142 MMHG
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
## 2023-12-20 PROCEDURE — 97163 PT EVAL HIGH COMPLEX 45 MIN: CPT

## 2023-12-20 PROCEDURE — 97167 OT EVAL HIGH COMPLEX 60 MIN: CPT

## 2023-12-20 PROCEDURE — G1004 CDSM NDSC: HCPCS

## 2023-12-20 PROCEDURE — 72125 CT NECK SPINE W/O DYE: CPT

## 2023-12-20 PROCEDURE — 99284 EMERGENCY DEPT VISIT MOD MDM: CPT | Performed by: STUDENT IN AN ORGANIZED HEALTH CARE EDUCATION/TRAINING PROGRAM

## 2023-12-20 PROCEDURE — 73630 X-RAY EXAM OF FOOT: CPT

## 2023-12-20 PROCEDURE — 70450 CT HEAD/BRAIN W/O DYE: CPT

## 2023-12-20 RX ORDER — ACETAMINOPHEN 325 MG/1
650 TABLET ORAL EVERY 6 HOURS PRN
COMMUNITY

## 2023-12-20 RX ORDER — MIRTAZAPINE 7.5 MG/1
7.5 TABLET, FILM COATED ORAL
COMMUNITY

## 2023-12-20 RX ORDER — ALPRAZOLAM 0.25 MG/1
0.25 TABLET ORAL 2 TIMES DAILY PRN
COMMUNITY

## 2023-12-20 NOTE — ED NOTES
Patient received in hallway bed from Assisted Living facility.Alert/oriented to person and place.Denies any head pain or neck pain or pain to body.  No noted bumps when assessing head or lacerations to.Patient has noted old area to left top of head with old dried scab.Side rails are up and being monitored closely.ER MD examined patient.     Taylor Dewey RN  12/20/23 [id number]

## 2023-12-20 NOTE — PLAN OF CARE
Problem: OCCUPATIONAL THERAPY ADULT  Goal: Performs self-care activities at highest level of function for planned discharge setting.  See evaluation for individualized goals.  Description: Treatment Interventions: ADL retraining, Functional transfer training, Endurance training, Cognitive reorientation, Patient/family training, Compensatory technique education, Continued evaluation, Energy conservation, Activityengagement, Equipment evaluation/education          See flowsheet documentation for full assessment, interventions and recommendations.   Note: Limitation: Decreased ADL status     Assessment: Pt is an 89yo male admitted to ED at hospitals on 12/20/23 w/ frequent falls from [facility]. Multiple recent admissions. No traumatic injuries identified. Pt admit from [facility] and needs assistance w/ ADL/ IADL due to impaired cognition. Pt reports no AD for functional mobility. Upon eval, pt alert and oriented to person. Unable to provide consistent, accurate history. Pt required min A to complete bed mobility, mod A sit to stand, mod A LBD, S UBD. Pt unable to tolerate or maintain standing to complete functional transfers due to L LE pain. Pt is completing ADLs below baseline level of I and would benefit from OT in acute care to maximize functional independence. Recommend level II rehab resource intensity when medically stable. Will continue to follow     Rehab Resource Intensity Level, OT: III (Minimum Resource Intensity) (pend medical optimization, support / assist at PLOF)

## 2023-12-20 NOTE — DISCHARGE INSTRUCTIONS
As discussed please follow-up with your family doctor.  Return to the emergency room for any new or concerning symptoms.

## 2023-12-20 NOTE — PHYSICAL THERAPY NOTE
PT EVALUATION       12/20/23 1159   PT Last Visit   PT Visit Date 12/20/23   Note Type   Note type Evaluation   Pain Assessment   Pain Assessment Tool 0-10   Pain Score No Pain  (no pain at rest ; moderate to sever pain at L foot with all attempts at mobility)   Pain Location/Orientation Orientation: Left;Location: Foot   Pain Onset/Description Onset: Ongoing;Descriptor: Sore   Effect of Pain on Daily Activities limits mobility   Patient's Stated Pain Goal No pain   Hospital Pain Intervention(s) Repositioned   Multiple Pain Sites No   Restrictions/Precautions   Weight Bearing Precautions Per Order No   Other Precautions Bed Alarm;Chair Alarm;Fall Risk;Pain;Cognitive   Home Living   Type of Home Assisted living  (per chart review at [facility])   Additional Comments Pt reports ambulatory without AD prior to admission   Prior Function   Level of [place] Independent with functional mobility;Needs assistance with ADLs;Needs assistance with IADLS  (Pt poor historian, unable to provide reliable history. per chart review pt walking without AD prior to admission, requires assist with ADLs/IADLs)   Lives With Facility staff   Receives Help From Other (Comment)  (staff)   IADLs Family/Friend/Other provides transportation;Family/Friend/Other provides meals;Family/Friend/Other provides medication management   Falls in the last 6 months 5 to 10  (Pt reports no falls, multiple close calls ; pt seen 3 times in the last 5 days in ED for recurrent falls)   Vocational Retired   General   Additional Pertinent History Pt is an 88 year-old male who presents to the ED today (12/20/23) due to recurrent falls. Has been seen 3 times in the last 5 days for falls. CT negative. Xray of L foot on 12/17/23 negative for fracture   Family/Caregiver Present No   Cognition   Overall Cognitive Status Impaired   Arousal/Participation Cooperative   Orientation Level Oriented to person;Disoriented to place;Disoriented to time;Disoriented to  "situation   Memory Decreased recall of recent events   Following Commands Follows one step commands with increased time or repetition   Subjective   Subjective \"I've had a few close calls\"   RLE Assessment   RLE Assessment WFL   LLE Assessment   LLE Assessment   (Hip/knee 3- to 3/5, Ankle 1/5 marcano by pain ; + swelling at L foot)   Bed Mobility   Supine to Sit 4  Minimal assistance   Additional items Assist x 1;Verbal cues;Increased time required   Sit to Supine 4  Minimal assistance   Additional items Assist x 1;Verbal cues;LE management   Transfers   Sit to Stand 3  Moderate assistance   Additional items Assist x 1;Verbal cues   Stand to Sit 3  Moderate assistance   Additional items Assist x 1;Verbal cues   Additional Comments Unable to tolerate standing due to L foot pain, minimal WB LLE, unable to ambulate + returned to sitting   Ambulation/Elevation   Gait Assistance Not tested  (unable to ambulate due to severe L foot pain in standing)   Balance   Static Sitting Fair   Dynamic Sitting Fair -   Static Standing Poor -   Dynamic Standing Poor -   Activity Tolerance   Activity Tolerance Patient limited by pain   Medical Staff Made Aware yes Dr. Martin   Assessment   Prognosis Good   Problem List Decreased strength;Decreased range of motion;Decreased endurance;Impaired balance;Decreased mobility;Decreased cognition;Impaired judgement;Decreased safety awareness;Pain   Assessment Patient seen for Physical Therapy evaluation. Patient admitted with frequent falls.  Comorbidities affecting patient's physical performance include: cardiac disease, COVID-19, dementia, GERD, hypertension, and pacemaker.  Personal factors affecting patient at time of initial evaluation include: ambulating with assistive device, inability to ambulate household distances, inability to navigate community distances, inability to navigate level surfaces without external assistance, positive fall history, limited insight into impairments, inability " to perform ADLS, inability to perform IADLS , and inability to live alone. Prior to admission, patient was independent with functional mobility without assistive device, requiring assist for ADLS, requiring assist for IADLS, and an assisted living resident.  Please find objective findings from Physical Therapy assessment regarding body systems outlined above with impairments and limitations including weakness, impaired balance, decreased endurance, impaired coordination, gait deviations, pain, decreased activity tolerance, decreased functional mobility tolerance, decreased safety awareness, impaired judgement, fall risk, and decreased cognition.  The Barthel Index was used as a functional outcome tool presenting with a score of Barthel Index Score: 40 today indicating marked limitations of functional mobility and ADLS.  Patient's clinical presentation is currently unstable/unpredictable as seen in patient's presentation of changing level of pain, increased fall risk, new onset of impairment of functional mobility, and decreased endurance. Pt would benefit from continued Physical Therapy treatment to address deficits as defined above and maximize level of functional mobility. As demonstrated by objective findings, the assigned level of complexity for this evaluation is high.The patient's -[facility] Basic Mobility Inpatient Short Form Raw Score is 12. A Raw score of less than or equal to 16 suggests the patient may benefit from discharge to post-acute rehabilitation services. Please also refer to the recommendation of the Physical Therapist for safe discharge planning.   Goals   Patient Goals to decrease L foot pain   STG Expiration Date 12/27/23   Short Term Goal #1 pt will perform bed mobility with supervision ; Pt will perform bed <> chair transfer with Min A + RW ; Pt will ambulate x 50 feet Min A with RW ; Standing balance will improve to Fair- to decrease risk of falls ; AMPAC score will improve >16/24 to  demonstrate improved functional independence   LTG Expiration Date 01/03/24   Long Term Goal #1 pt will perform bed mobility IND ; Pt will perform bed <> chair transfer with supervision + RW ; Pt will ambulate x 50 feet supervision with RW ; Standing balance will improve to Fair to decrease risk of falls ; AMPAC score will improve >18/24 to demonstrate improved functional independence   Plan   Treatment/Interventions Functional transfer training;LE strengthening/ROM;Therapeutic exercise;Endurance training;ADL retraining;Bed mobility;Gait training;Spoke to nursing;Spoke to MD;Spoke to case management   PT Frequency Other (Comment)  (5x/week)   Discharge Recommendation   Rehab Resource Intensity Level, PT II (Moderate Resource Intensity)   AM-PAC Basic Mobility Inpatient   Turning in Flat Bed Without Bedrails 3   Lying on Back to Sitting on Edge of Flat Bed Without Bedrails 3   Moving Bed to Chair 2   Standing Up From Chair Using Arms 2   Walk in Room 1   Climb 3-5 Stairs With Railing 1   Basic Mobility Inpatient Raw Score 12   Basic Mobility Standardized Score 32.23   Highest Level Of Mobility   -HL Goal 4: Move to chair/commode   JH-HLM Achieved 3: Sit at edge of bed  (Able to stand x 1 attempt but unable to maintain standing due to minimal WB LLE, L foot pain)   Barthel Index   Feeding 10   Bathing 0   Grooming Score 0   Dressing Score 5   Bladder Score 10   Bowels Score 10   Toilet Use Score 5   Transfers (Bed/Chair) Score 0   Mobility (Level Surface) Score 0   Stairs Score 0   Barthel Index Score 40   End of Consult   Patient Position at End of Consult Supine;All needs within reach   Licensure   [place] License Number  Rebeca Munoz [id number]

## 2023-12-20 NOTE — PLAN OF CARE
Problem: PHYSICAL THERAPY ADULT  Goal: Performs mobility at highest level of function for planned discharge setting.  See evaluation for individualized goals.  Description: Treatment/Interventions: Functional transfer training, LE strengthening/ROM, Therapeutic exercise, Endurance training, ADL retraining, Bed mobility, Gait training, Spoke to nursing, Spoke to MD, Spoke to case management          See flowsheet documentation for full assessment, interventions and recommendations.  Note: Prognosis: Good  Problem List: Decreased strength, Decreased range of motion, Decreased endurance, Impaired balance, Decreased mobility, Decreased cognition, Impaired judgement, Decreased safety awareness, Pain  Assessment: Patient seen for Physical Therapy evaluation. Patient admitted with frequent falls.  Comorbidities affecting patient's physical performance include: cardiac disease, COVID-19, dementia, GERD, hypertension, and pacemaker.  Personal factors affecting patient at time of initial evaluation include: ambulating with assistive device, inability to ambulate household distances, inability to navigate community distances, inability to navigate level surfaces without external assistance, positive fall history, limited insight into impairments, inability to perform ADLS, inability to perform IADLS , and inability to live alone. Prior to admission, patient was independent with functional mobility without assistive device, requiring assist for ADLS, requiring assist for IADLS, and an assisted living resident.  Please find objective findings from Physical Therapy assessment regarding body systems outlined above with impairments and limitations including weakness, impaired balance, decreased endurance, impaired coordination, gait deviations, pain, decreased activity tolerance, decreased functional mobility tolerance, decreased safety awareness, impaired judgement, fall risk, and decreased cognition.  The Barthel Index was used  as a functional outcome tool presenting with a score of Barthel Index Score: 40 today indicating marked limitations of functional mobility and ADLS.  Patient's clinical presentation is currently unstable/unpredictable as seen in patient's presentation of changing level of pain, increased fall risk, new onset of impairment of functional mobility, and decreased endurance. Pt would benefit from continued Physical Therapy treatment to address deficits as defined above and maximize level of functional mobility. As demonstrated by objective findings, the assigned level of complexity for this evaluation is high.The patient's AM-[facility] Basic Mobility Inpatient Short Form Raw Score is 12. A Raw score of less than or equal to 16 suggests the patient may benefit from discharge to post-acute rehabilitation services. Please also refer to the recommendation of the Physical Therapist for safe discharge planning.        Rehab Resource Intensity Level, PT: II (Moderate Resource Intensity)    See flowsheet documentation for full assessment.

## 2023-12-20 NOTE — ED NOTES
Left a message that this is a follow up on reduction strategy and medication use. Left my name Kristyn Remy and phone number [phone].      Patient transported to CT     Taylor Dewey RN  12/20/23 4989

## 2023-12-20 NOTE — ED PROVIDER NOTES
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     Other options for oral preventive medications include: amitriptyline or nortriptyline, zonisamide, atogepant, rimegepant, valproate, verapamil, gabapentin, pregabaline, duloxetine, candesartan, namenda, escitalopram, venlafaxine, and levetiracetam. Want to avoid weight gain.    Other procedures that can be used to prevent headaches include:  nerve blocks and trigger point injections. We recommend to obtain prior authorization from insurance when considering these.     Anti CGRP monoclonal antibodies (Erenumab or Aimovig, Fremanezumab or Ajovy, Galcanezumab or Emgality, and Eptinezumab or Vyepti) are a group of biological injectable treatments approved for prevention of migraine. These have to be stopped 6 months prior to conception.     Several non-invasive neuromodulation devices (GammaCore, Cefaly and Nerivio) are available to treat headaches preventively and/or acutely. These are typically not covered by insurance, but the companies have a trial period and will refund you if the device is ineffective and returned within that period.     Follow-up in the Headache Clinic in 6 weeks  for FUV and in 12 weeks for the next botox treatment.     We appreciate the opportunity to participate in the care of Andressa.     Please, do not hesitate to call me at [phone] if you have any questions or concerns.         Theresa Lewis MD  [facility]  [phone]    I spent 23 minutes on this date of service performing the following activities: obtaining history, performing examination, entering orders, documenting, preparing for visit and providing counseling and education.    Theresa Dalton MD  8/10/2022  1:08 PM  Signed  Procedures  Procedure Note for Botox Injections for Headache:    Indication for procedure:    Diagnosis Plan   1. 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  Onabotulinumtoxin A 200 U were diluted in 4 mL of saline.    Lot number and expiration date documented in informed consent and MAR.  I performed a time-out, including 2 unique patient identifiers with Andressa.  Using a 30 gauge 1/2 inch needle, I injected 0.1mL = 5 U into each site according to the Chronic Migraine Protocol (PREEMPT Studies).   The following table reports the number of sites injected in each muscle.     Muscle Midline Right Left   Procerus 1          1 1   Frontalis   2 2   Temporalis   4  4    Occipitalis   3  3    Cervical Paraspinal   2 2   Trapezius   3  3    Other:            155 Units were injected and 45 Units were wasted.     Andressa tolerated the procedure well, without complications.  She was instructed that she could experience increased pain in the next 2-3 days, which should be treated with ice and anti-inflammatory medications.      We appreciate the opportunity to participate in the care of Andressa LEE Olivia.     Please, do not hesitate to call me at [phone] if you have any questions or concerns.        Theresa Lewis MD  [facility]  [facility]  [phone]

## 2023-12-20 NOTE — ED NOTES
Per MD, case management will arrange transport home, pt is hospice care at this time.      Lily Sarmiento RN  12/20/23 6812

## 2023-12-20 NOTE — OCCUPATIONAL THERAPY NOTE
Occupational Therapy Evaluation     Patient Name: Michael Strong  Today's Date: 12/20/2023  Problem List  Active Problems:  There are no active Hospital Problems.    Past Medical History  Past Medical History:   Diagnosis Date    Alzheimer disease (HCC)     CAD (coronary artery disease)     GERD (gastroesophageal reflux disease)     Gout     History of heart attack     HLD (hyperlipidemia)     Hypertension      Past Surgical History  Past Surgical History:   Procedure Laterality Date    CARDIAC ELECTROPHYSIOLOGY PROCEDURE N/A 4/14/2023    Procedure: Cardiac pacer implant;  Surgeon: Elle Jhaveri MD;  Location: [facility];  Service: Cardiology    HIATAL HERNIA REPAIR           12/20/23 1150   OT Last Visit   OT Visit Date 12/20/23  (Wednesday)   Note Type   Note type Evaluation   Pain Assessment   Pain Assessment Tool FLACC   Pain Score No Pain  (pt denied pain supine head of stretcher elevatedin ED upon therapist arrival)   Pain Location/Orientation Orientation: Left;Location: Leg  (foot /ankle)   Effect of Pain on Daily Activities limits standing tolerance, balance. Unable to maintain standing   Patient's Stated Pain Goal No pain   Hospital Pain Intervention(s) Repositioned;Ambulation/increased activity;Emotional support   Pain Rating: FLACC (Rest) - Face 0   Pain Rating: FLACC (Rest) - Legs 0   Pain Rating: FLACC (Rest) - Activity 0   Pain Rating: FLACC (Rest) - Cry 0   Pain Rating: FLACC (Rest) - Consolability 0   Score: FLACC (Rest) 0   Pain Rating: FLACC (Activity) - Face 1   Pain Rating: FLACC (Activity) - Legs 1   Pain Rating: FLACC (Activity) - Activity 1   Pain Rating: FLACC (Activity) - Cry 2   Pain Rating: FLACC (Activity) - Consolability 1   Score: FLACC (Activity) 6   Restrictions/Precautions   Weight Bearing Precautions Per Order No   Other Precautions Hard of hearing  (recommend bed/ chair alarm)   Home Living   Type of Home Assisted living   Additional Comments Pt admit from The  [facility] and reports not AD at baseline   Prior Function   Level of [place] Independent with functional mobility;Needs assistance with ADLs;Needs assistance with IADLS   Lives With Facility staff   Receives Help From Family;Other (Comment)  (staff at [facility])   IADLs Family/Friend/Other provides transportation;Family/Friend/Other provides meals;Family/Friend/Other provides medication management   Falls in the last 6 months 5 to 10   Vocational Retired   Comments Pt is not an accurate historian upon eval or at baseline due to dementia. Needs assistance w/ ADL/ IADL and reports not using AD for functional mobility   Lifestyle   Autonomy Pt needs assistance w/ IADLs at baseline and is not an accurate historian.   Reciprocal Relationships Supportive staff at [facility]   Service to Others Pt confirmed that he is a retired    Intrinsic Gratification Will continue to assess   General   Additional Pertinent History Pt admitted from [facility] following frequent falls. Pt is not an accurate historian at baseline due to dementia   Additional General Comments Significant PMH impacting his occupational performance includes Alzheimer's dementia, pacemaker, CAD, HTN, hx Gout,lumbar spinal stenosis w/ radiculopathy. Personal and environmental factors supporting performance includes staff at [facility], independent w/ functional mobility; barriers include advanced age, inability to complete IADLs, assist required at baseline, recent admission (s).   Subjective   Subjective \"I am cold\"   ADL   Where Assessed   (edge of stretcher in ED)   Eating Assistance 6  Modified independent  (demo UE ROM / strength WFL to complete)   Eating Deficit Setup   Grooming Assistance 5  Supervision/Setup   Grooming Deficit Setup;Supervision/safety;Increased time to complete  (simulated while seated at EOB; based on fxal obs, clinical judgement)   UB Bathing Assistance Unable to assess   LB Bathing Assistance Unable to assess   UB Dressing Assistance 5  " Supervision/Setup   UB Dressing Deficit Setup;Supervision/safety;Increased time to complete   LB Dressing Assistance 3  Moderate Assistance   LB Dressing Deficit Setup;Increased time to complete   Toileting Assistance  Unable to assess   Bed Mobility   Supine to Sit 4  Minimal assistance   Additional items Assist x 1;Increased time required;Verbal cues  (to pt's L)   Sit to Supine 5  Supervision   Additional items Assist x 1;Increased time required;Verbal cues   Additional Comments Pt supine head of stretcher upon arrival and post eval w/ needs met, call bell in reach   Transfers   Sit to Stand 3  Moderate assistance   Additional items Assist x 1;Increased time required;Verbal cues   Stand to Sit 3  Moderate assistance   Additional items Assist x 1;Increased time required;Verbal cues;Bedrails;Armrests   Stand pivot Unable to assess   Additional Comments Pt unable to maintain standing due to L foot / ankle pain. + edema L foot and ankle. Returned to edge of stretcher   Functional Mobility   Additional Comments NT. Will continue to assess   Balance   Static Sitting Fair   Dynamic Sitting Fair -   Static Standing Poor   Ambulatory   (NT)   Activity Tolerance   Activity Tolerance Patient limited by pain   Medical Staff Made Aware care coordination w/ PTRebeca due to impaired cognition, regression from baseline and decreased activity tolerance.   Nurse Made Aware spoke w/ RN   RUE Assessment   RUE Assessment   (observed during ADLs, at least 3+/5)   RUE Strength   RUE Overall Strength Within Functional Limits - able to perform ADL tasks with strength   LUE Strength   LUE Overall Strength Within Functional Limits - able to perform ADL tasks with strength  (observed during ADLs, at least 3+/5)   Hand Function   Gross Motor Coordination Functional   Cognition   Overall Cognitive Status Impaired   Arousal/Participation Alert   Attention Attends with cues to redirect   Orientation Level Oriented to person;Disoriented to  place;Disoriented to time;Disoriented to situation   Memory Decreased short term memory;Decreased recall of recent events;Decreased recall of precautions  (able to report full name and birthdate. Pt reports living w/ his wife in [place], [place].)   Following Commands Follows one step commands with increased time or repetition   Comments Identified pt by full name and birthdate. Alert and pleasantly confused. Unable to provide consistent, accurate social history   Assessment   Limitation Decreased ADL status   Assessment Pt is an 87yo male admitted [facility] ED at [facility] on [facility]23 w/ frequent falls from [facility]. Multiple recent admissions. No traumatic injuries identified. Pt admit from [facility] and needs assistance w/ ADL/ IADL due to impaired cognition. Pt reports no AD for functional mobility. Upon eval, pt alert and oriented to person. Unable to provide consistent, accurate history. Pt required min A to complete bed mobility, mod A sit to stand, mod A LBD, S UBD. Pt unable to tolerate or maintain standing to complete functional transfers due to L LE pain. Pt is completing ADLs below baseline level of I and would benefit from OT in acute care to maximize functional independence. Recommend level II rehab resource intensity when medically stable. Will continue to follow   Goals   Patient Goals Pt stated that he would like to have less L foot pain and get warm. Therapist provided w/ blankets   Plan   Treatment Interventions ADL retraining;Functional transfer training;Endurance training;Cognitive reorientation;Patient/family training;Compensatory technique education;Continued evaluation;Energy conservation;Activityengagement;Equipment evaluation/education   Goal Expiration Date 12/30/23   OT Treatment Day 0  (Wednesday 12/20/23)   OT Frequency 2-3x/wk   Discharge Recommendation   Rehab Resource Intensity Level, OT III (Minimum Resource Intensity)  (pend medical optimization, support / assist at PLOF)   AM-PAC Daily  Activity Inpatient   Lower Body Dressing 2   Bathing 2   Toileting 2   Upper Body Dressing 3   Grooming 3   Eating 4   Daily Activity Raw Score 16   Daily Activity Standardized Score (Calc for Raw Score >=11) 35.96   AM-PAC Applied Cognition Inpatient   Following a Speech/Presentation 2   Understanding Ordinary Conversation 3   Taking Medications 1   Remembering Where Things Are Placed or Put Away 1   Remembering List of 4-5 Errands 1   Taking Care of Complicated Tasks 1   Applied Cognition Raw Score 9   Applied Cognition Standardized Score 22.48   Barthel Index   Feeding 5   Bathing 0   Grooming Score 0   Dressing Score 5   Bladder Score 10   Bowels Score 10   Toilet Use Score 5   Transfers (Bed/Chair) Score 0   Mobility (Level Surface) Score 0   Stairs Score 0   Barthel Index Score 35   End of Consult   Education Provided Yes  (role of oT)   Patient Position at End of Consult Supine;All needs within reach   Nurse Communication Nurse aware of consult   Licensure   [place] License Number  Shelbi Delacruz OTR/L [id number]       The patient's raw score on the AM-PAC Daily Activity Inpatient Short Form is 16. A raw score of less than 19 suggests the patient may benefit from discharge to post-acute rehabilitation services. Please refer to the recommendation of the Occupational Therapist for safe discharge planning.    Pt goals to be met by 12/30/23 to max I w/ ADLs and improve engagement to return to PLOF w/ staff assist includes:    -Pt will consistently follow one step directions during ADLs w/ good recall    -Pt will complete bed mobility supine <> sit w/ S in preparation for ADLs    -Pt will complete functional transfers to bed, chair and toilet using LRAD, DME as needed w/ S    -Pt will demonstrate improved functional standing tolerance for at least 4 minutes using LRAD as needed w/ at least fair - balance to participate in ongoing eval of functional mobility to assist in DC planning    -Pt will complete LBD w/  S    -Pt will demonstrate improved activity and sitting tolerance OOB In chair for all meals    Shelbi Delacruz, OTR/L  [id number]  [id number]

## 2023-12-20 NOTE — ED NOTES
Patient waiting for transport back to assisted living facility, pickup time is scheduled for 4pm.     Taylor Dewey RN  12/20/23 5719

## 2023-12-20 NOTE — CASE MANAGEMENT
Case Management ED Discharge Planning Note    Patient name Michael Strong  Location ED HW1/ED HW1 MRN [id number]  : 1935 Date 2023        OBJECTIVE:  Predictive Model Details          86%  Factor Value    Risk of Hospital Admission or ED Visit Model 43% Number of ED Visits 4     15% Number of Hospitalizations 2     10% Is in Relationship No     10% Has Chronic Kidney Disease Yes     7% Has Anemia Yes     6% Has Medicare Yes     6% Has CVD Yes     4% Has CHF Yes      Chief Complaint: Head injury .  Patient Class: Emergency  Preferred Pharmacy:   [facility] - [place], [place] - [facility]  [address] [address]  Phone: [phone] Fax: [phone]    Primary Care Provider: No primary care provider on file.    Primary Insurance: MEDICARE  Secondary Insurance: BLUE CROSS    ED Discharge Details:    CM called patient's daughter Roshni who was listed as first contact. CM made Roshni aware that patient was in the ED due to another fall and short term rehab is recommended. Roshni stated patient shouldn't go to a rehab facility and that he should return to [facility].     CM called [facility] and spoke with  Johanne and Nurse Angela who made CM aware that daughter Taylor is the primary contact and POA for the patient. Johanne stated that the patient is all set to start hospice services and confirmed he can return to [facility].    CM called patient's Daughter Taylor (POA) who confirmed patient will be going on hospice at [facility]. Taylor confirmed her choice is for patient to return to the [facility]. CM made Taylor aware that transportation will be arranged for patient to return to [facility] this afternoon. Taylor verbalized her understanding of the plan for patient.     CM requested stretcher van transportation in [place]. A 2:15pm  time requested, Special [facility] Mobility confirmed 4pm .     CM called [facility] and made them aware of confirmed  4pm . CM made ED physician and nursing aware of confirmed 4pm .     CM updated patient's contacts, moving Taylor to primary contact #1 and Roshni as contact #2 and noted Taylor as POA in comment section.

## 2023-12-20 NOTE — ED NOTES
Staff attempted to ambulate patient and patient noted to have very little strength to stand and walk, ER MD informed and PT/OT eval.was ordered.     Taylor Dewey RN  12/20/23 7928

## 2024-01-26 ENCOUNTER — REMOTE DEVICE CLINIC VISIT (OUTPATIENT)
Dept: CARDIOLOGY CLINIC | Facility: CLINIC | Age: 89
End: 2024-01-26
Payer: MEDICARE

## 2024-01-26 ENCOUNTER — TELEPHONE (OUTPATIENT)
Dept: CARDIOLOGY CLINIC | Facility: CLINIC | Age: 89
End: 2024-01-26

## 2024-01-26 DIAGNOSIS — Z95.0 PRESENCE OF PERMANENT CARDIAC PACEMAKER: Primary | ICD-10-CM

## 2024-01-26 PROCEDURE — 93294 REM INTERROG EVL PM/LDLS PM: CPT | Performed by: INTERNAL MEDICINE

## 2024-01-26 PROCEDURE — 93296 REM INTERROG EVL PM/IDS: CPT | Performed by: INTERNAL MEDICINE

## 2024-01-26 NOTE — PROGRESS NOTES
Results for orders placed or performed in visit on 01/26/24   Cardiac EP device report    Narrative    MDT SC/PM-ACTIVE SYSTEM IS MRI CONDITIONAL  CARELINK TRANSMISSION: BATTERY VOLTAGE ADEQUATE. (12.6 YRS)  95%. ALL AVAILABLE LEAD PARAMETERS WITHIN NORMAL LIMITS. NO SIGNIFICANT HIGH RATE EPISODES. NORMAL DEVICE FUNCTION.---CHAVIRA

## 2024-01-26 NOTE — TELEPHONE ENCOUNTER
----- Message from Elle Jhaveri MD sent at 1/26/2024  8:55 AM EST -----  Patient's device interrogation reading shows,  device function is normal.      Please call patient.